# Patient Record
Sex: FEMALE | Employment: UNEMPLOYED | URBAN - METROPOLITAN AREA
[De-identification: names, ages, dates, MRNs, and addresses within clinical notes are randomized per-mention and may not be internally consistent; named-entity substitution may affect disease eponyms.]

---

## 2022-01-13 ENCOUNTER — OFFICE VISIT (OUTPATIENT)
Dept: FAMILY MEDICINE | Facility: CLINIC | Age: 32
End: 2022-01-13
Payer: COMMERCIAL

## 2022-01-13 VITALS
TEMPERATURE: 98.6 F | RESPIRATION RATE: 18 BRPM | SYSTOLIC BLOOD PRESSURE: 99 MMHG | WEIGHT: 135.7 LBS | HEART RATE: 78 BPM | BODY MASS INDEX: 22.61 KG/M2 | OXYGEN SATURATION: 98 % | DIASTOLIC BLOOD PRESSURE: 65 MMHG | HEIGHT: 65 IN

## 2022-01-13 DIAGNOSIS — Z23 NEED FOR TDAP VACCINATION: ICD-10-CM

## 2022-01-13 DIAGNOSIS — Z12.4 CERVICAL CANCER SCREENING: ICD-10-CM

## 2022-01-13 DIAGNOSIS — L30.9 DERMATITIS: ICD-10-CM

## 2022-01-13 DIAGNOSIS — H10.13 ALLERGIC CONJUNCTIVITIS, BILATERAL: ICD-10-CM

## 2022-01-13 DIAGNOSIS — Z02.89 ENCOUNTER FOR HEALTH EXAMINATION OF REFUGEE: Primary | ICD-10-CM

## 2022-01-13 LAB
ALBUMIN UR-MCNC: NEGATIVE MG/DL
APPEARANCE UR: CLEAR
BASOPHILS # BLD AUTO: 0 10E3/UL (ref 0–0.2)
BASOPHILS NFR BLD AUTO: 0 %
BILIRUB UR QL STRIP: NEGATIVE
COLOR UR AUTO: YELLOW
EOSINOPHIL # BLD AUTO: 0.2 10E3/UL (ref 0–0.7)
EOSINOPHIL NFR BLD AUTO: 2 %
ERYTHROCYTE [DISTWIDTH] IN BLOOD BY AUTOMATED COUNT: 12.4 % (ref 10–15)
GLUCOSE UR STRIP-MCNC: NEGATIVE MG/DL
HCT VFR BLD AUTO: 38.3 % (ref 35–47)
HGB BLD-MCNC: 12.7 G/DL (ref 11.7–15.7)
HGB UR QL STRIP: NEGATIVE
KETONES UR STRIP-MCNC: NEGATIVE MG/DL
LEUKOCYTE ESTERASE UR QL STRIP: NEGATIVE
LYMPHOCYTES # BLD AUTO: 2.3 10E3/UL (ref 0.8–5.3)
LYMPHOCYTES NFR BLD AUTO: 34 %
MCH RBC QN AUTO: 29.7 PG (ref 26.5–33)
MCHC RBC AUTO-ENTMCNC: 33.2 G/DL (ref 31.5–36.5)
MCV RBC AUTO: 90 FL (ref 78–100)
MONOCYTES # BLD AUTO: 0.5 10E3/UL (ref 0–1.3)
MONOCYTES NFR BLD AUTO: 7 %
NEUTROPHILS # BLD AUTO: 3.9 10E3/UL (ref 1.6–8.3)
NEUTROPHILS NFR BLD AUTO: 57 %
NITRATE UR QL: NEGATIVE
PH UR STRIP: 7 [PH] (ref 5–7)
PLATELET # BLD AUTO: 244 10E3/UL (ref 150–450)
RBC # BLD AUTO: 4.28 10E6/UL (ref 3.8–5.2)
SP GR UR STRIP: 1.02 (ref 1–1.03)
UROBILINOGEN UR STRIP-ACNC: 0.2 E.U./DL
WBC # BLD AUTO: 6.9 10E3/UL (ref 4–11)

## 2022-01-13 PROCEDURE — 36415 COLL VENOUS BLD VENIPUNCTURE: CPT | Performed by: FAMILY MEDICINE

## 2022-01-13 PROCEDURE — 90471 IMMUNIZATION ADMIN: CPT | Performed by: FAMILY MEDICINE

## 2022-01-13 PROCEDURE — 86787 VARICELLA-ZOSTER ANTIBODY: CPT | Performed by: FAMILY MEDICINE

## 2022-01-13 PROCEDURE — 81003 URINALYSIS AUTO W/O SCOPE: CPT | Performed by: FAMILY MEDICINE

## 2022-01-13 PROCEDURE — 86708 HEPATITIS A ANTIBODY: CPT | Performed by: FAMILY MEDICINE

## 2022-01-13 PROCEDURE — 86780 TREPONEMA PALLIDUM: CPT | Performed by: FAMILY MEDICINE

## 2022-01-13 PROCEDURE — G0145 SCR C/V CYTO,THINLAYER,RESCR: HCPCS | Performed by: FAMILY MEDICINE

## 2022-01-13 PROCEDURE — 86704 HEP B CORE ANTIBODY TOTAL: CPT | Performed by: FAMILY MEDICINE

## 2022-01-13 PROCEDURE — 99213 OFFICE O/P EST LOW 20 MIN: CPT | Mod: 25 | Performed by: FAMILY MEDICINE

## 2022-01-13 PROCEDURE — 87491 CHLMYD TRACH DNA AMP PROBE: CPT | Performed by: FAMILY MEDICINE

## 2022-01-13 PROCEDURE — 87389 HIV-1 AG W/HIV-1&-2 AB AG IA: CPT | Performed by: FAMILY MEDICINE

## 2022-01-13 PROCEDURE — 99000 SPECIMEN HANDLING OFFICE-LAB: CPT | Performed by: FAMILY MEDICINE

## 2022-01-13 PROCEDURE — 82607 VITAMIN B-12: CPT | Performed by: FAMILY MEDICINE

## 2022-01-13 PROCEDURE — 86706 HEP B SURFACE ANTIBODY: CPT | Performed by: FAMILY MEDICINE

## 2022-01-13 PROCEDURE — 87340 HEPATITIS B SURFACE AG IA: CPT | Performed by: FAMILY MEDICINE

## 2022-01-13 PROCEDURE — 87624 HPV HI-RISK TYP POOLED RSLT: CPT | Performed by: FAMILY MEDICINE

## 2022-01-13 PROCEDURE — 80053 COMPREHEN METABOLIC PANEL: CPT | Performed by: FAMILY MEDICINE

## 2022-01-13 PROCEDURE — 99385 PREV VISIT NEW AGE 18-39: CPT | Mod: 25 | Performed by: FAMILY MEDICINE

## 2022-01-13 PROCEDURE — 82306 VITAMIN D 25 HYDROXY: CPT | Performed by: FAMILY MEDICINE

## 2022-01-13 PROCEDURE — 85025 COMPLETE CBC W/AUTO DIFF WBC: CPT | Performed by: FAMILY MEDICINE

## 2022-01-13 PROCEDURE — 90715 TDAP VACCINE 7 YRS/> IM: CPT | Performed by: FAMILY MEDICINE

## 2022-01-13 PROCEDURE — 86803 HEPATITIS C AB TEST: CPT | Performed by: FAMILY MEDICINE

## 2022-01-13 PROCEDURE — 87591 N.GONORRHOEAE DNA AMP PROB: CPT | Performed by: FAMILY MEDICINE

## 2022-01-13 PROCEDURE — 86682 HELMINTH ANTIBODY: CPT | Mod: 90 | Performed by: FAMILY MEDICINE

## 2022-01-13 PROCEDURE — 86481 TB AG RESPONSE T-CELL SUSP: CPT | Performed by: FAMILY MEDICINE

## 2022-01-13 RX ORDER — OLOPATADINE HYDROCHLORIDE 2 MG/ML
1 SOLUTION/ DROPS OPHTHALMIC DAILY
Qty: 2.5 ML | Refills: 3 | Status: SHIPPED | OUTPATIENT
Start: 2022-01-13

## 2022-01-13 RX ORDER — TRIAMCINOLONE ACETONIDE 1 MG/G
OINTMENT TOPICAL 2 TIMES DAILY
Qty: 30 G | Refills: 0 | Status: SHIPPED | OUTPATIENT
Start: 2022-01-13 | End: 2022-01-27

## 2022-01-13 ASSESSMENT — ENCOUNTER SYMPTOMS
DIARRHEA: 0
EYE PAIN: 1
ABDOMINAL PAIN: 0
WEAKNESS: 0
DIZZINESS: 0
NERVOUS/ANXIOUS: 0
BREAST MASS: 0
DYSURIA: 0
NAUSEA: 0
CHILLS: 0
ARTHRALGIAS: 1
MYALGIAS: 1
PARESTHESIAS: 0
FREQUENCY: 0
SORE THROAT: 0
COUGH: 0
JOINT SWELLING: 0
SHORTNESS OF BREATH: 0
HEMATOCHEZIA: 0
HEADACHES: 1
FEVER: 0
HEARTBURN: 1
HEMATURIA: 0
CONSTIPATION: 0
PALPITATIONS: 0

## 2022-01-13 ASSESSMENT — ANXIETY QUESTIONNAIRES
7. FEELING AFRAID AS IF SOMETHING AWFUL MIGHT HAPPEN: NOT AT ALL
4. TROUBLE RELAXING: NOT AT ALL
7. FEELING AFRAID AS IF SOMETHING AWFUL MIGHT HAPPEN: NOT AT ALL
2. NOT BEING ABLE TO STOP OR CONTROL WORRYING: NOT AT ALL
GAD7 TOTAL SCORE: 1
1. FEELING NERVOUS, ANXIOUS, OR ON EDGE: NOT AT ALL
3. WORRYING TOO MUCH ABOUT DIFFERENT THINGS: SEVERAL DAYS
GAD7 TOTAL SCORE: 1
GAD7 TOTAL SCORE: 1
6. BECOMING EASILY ANNOYED OR IRRITABLE: NOT AT ALL
5. BEING SO RESTLESS THAT IT IS HARD TO SIT STILL: NOT AT ALL

## 2022-01-13 ASSESSMENT — MIFFLIN-ST. JEOR: SCORE: 1322.66

## 2022-01-13 ASSESSMENT — PATIENT HEALTH QUESTIONNAIRE - PHQ9
SUM OF ALL RESPONSES TO PHQ QUESTIONS 1-9: 6
SUM OF ALL RESPONSES TO PHQ QUESTIONS 1-9: 6
10. IF YOU CHECKED OFF ANY PROBLEMS, HOW DIFFICULT HAVE THESE PROBLEMS MADE IT FOR YOU TO DO YOUR WORK, TAKE CARE OF THINGS AT HOME, OR GET ALONG WITH OTHER PEOPLE: SOMEWHAT DIFFICULT

## 2022-01-13 NOTE — PROGRESS NOTES
Pt has concerns about Vitamin D.     SUBJECTIVE:   CC: Barbara Arguello is an 31 year old woman who presents for preventive health visit.       Patient has been advised of split billing requirements and indicates understanding: Yes  Healthy Habits:     Getting at least 3 servings of Calcium per day:  Yes    Bi-annual eye exam:  Yes    Dental care twice a year:  NO    Sleep apnea or symptoms of sleep apnea:  None    Diet:  Regular (no restrictions)    Frequency of exercise:  6-7 days/week    Duration of exercise:  30-45 minutes    Taking medications regularly:  Yes    Medication side effects:  None    PHQ-2 Total Score: 0    Additional concerns today:  Yes      [place] refugee. Former .       Hx of allergic conjunctivitis. Would like medication for this. Has also been having tooth pain for about two months and needs to see a dentist.     Today's PHQ-2 Score:   PHQ-2 ( 1999 Pfizer) 1/13/2022   Q1: Little interest or pleasure in doing things 0   Q2: Feeling down, depressed or hopeless 0   PHQ-2 Score 0   Q1: Little interest or pleasure in doing things Not at all   Q2: Feeling down, depressed or hopeless Not at all   PHQ-2 Score 0       Abuse: Current or Past (Physical, Sexual or Emotional) - No  Do you feel safe in your environment? Yes        Social History     Tobacco Use     Smoking status: Never Smoker     Smokeless tobacco: Never Used   Substance Use Topics     Alcohol use: Never     If you drink alcohol do you typically have >3 drinks per day or >7 drinks per week? No    Alcohol Use 1/13/2022   Prescreen: >3 drinks/day or >7 drinks/week? No   No flowsheet data found.    Reviewed orders with patient.  Reviewed health maintenance and updated orders accordingly - Yes  Labs reviewed in EPIC    Breast Cancer Screening:  Any new diagnosis of family breast, ovarian, or bowel cancer? No    FHS-7: No flowsheet data found.  click delete button to remove this line now  Patient under 40 years of age: Routine  "Mammogram Screening not recommended.   Pertinent mammograms are reviewed under the imaging tab.    History of abnormal Pap smear: NO - age 30- 65 PAP every 3 years recommended (has never had a pap)     Reviewed and updated as needed this visit by clinical staff  Tobacco  Allergies  Meds      Soc Hx       Reviewed and updated as needed this visit by Provider                   Review of Systems   Constitutional: Negative for chills and fever.   HENT: Negative for congestion, ear pain, hearing loss and sore throat.    Eyes: Positive for pain. Negative for visual disturbance.   Respiratory: Negative for cough and shortness of breath.    Cardiovascular: Negative for chest pain, palpitations and peripheral edema.   Gastrointestinal: Positive for heartburn. Negative for abdominal pain, constipation, diarrhea, hematochezia and nausea.   Breasts:  Negative for tenderness, breast mass and discharge.   Genitourinary: Positive for pelvic pain. Negative for dysuria, frequency, genital sores, hematuria, urgency, vaginal bleeding and vaginal discharge.   Musculoskeletal: Positive for arthralgias and myalgias. Negative for joint swelling.   Skin: Negative for rash.   Neurological: Positive for headaches. Negative for dizziness, weakness and paresthesias.   Psychiatric/Behavioral: Positive for mood changes. The patient is not nervous/anxious.           OBJECTIVE:   BP 99/65 (BP Location: Right arm, Patient Position: Chair, Cuff Size: Adult Regular)   Pulse 78   Temp 98.6  F (37  C) (Oral)   Resp 18   Ht 1.645 m (5' 4.76\")   Wt 61.6 kg (135 lb 11.2 oz)   LMP 01/06/2022 (Exact Date)   SpO2 98%   BMI 22.75 kg/m    Physical Exam  GENERAL: healthy, alert and no distress  EYES: Eyes grossly normal to inspection, PERRL and conjunctivae and sclerae normal  HENT: ear canals and TM's normal, nose and mouth without ulcers or lesions  NECK: no adenopathy, no asymmetry, masses, or scars and thyroid normal to palpation  RESP: lungs " clear to auscultation - no rales, rhonchi or wheezes  BREAST: normal without masses, tenderness or nipple discharge and no palpable axillary masses or adenopathy  CV: regular rate and rhythm, normal S1 S2, no S3 or S4, no murmur, click or rub, no peripheral edema and peripheral pulses strong  ABDOMEN: soft, nontender, no hepatosplenomegaly, no masses and bowel sounds normal   (female): normal female external genitalia, normal urethral meatus, vaginal mucosa pink, moist, well rugated, and normal cervix/adnexa/uterus without masses or discharge  MS: no gross musculoskeletal defects noted, no edema  SKIN: erythematous papules with some excoriations on anterior base of neck   NEURO: Normal strength and tone, mentation intact and speech normal  PSYCH: mentation appears normal, affect normal/bright    Diagnostic Test Results:  Labs reviewed in Epic  none     ASSESSMENT/PLAN:   (Z02.89) Encounter for health examination of refugee  (primary encounter diagnosis)  Plan: CBC with Platelets & Differential, Chlamydia         trachomatis PCR, Comprehensive metabolic panel,        Hepatitis A Antibody IgG, Hepatitis B core         antibody, Hepatitis B Surface Antibody,         Hepatitis B surface antigen, Hepatitis C Screen        Reflex to HCV RNA Quant and Genotype, HIV         Antigen Antibody Combo, Neisseria gonorrhoeae         PCR, Routine parasitology exam, Blood         parasitology exam, Quantiferon-TB Gold Plus,         Schistosoma Antibody IgG, Strongyloides         antibody IgG, Treponema Abs w Reflex to RPR and        Titer, UA reflex to Microscopic and Culture,         Varicella Zoster Virus Antibody IgG, Vitamin D         Deficiency, Vitamin B12      (Z12.4) Cervical cancer screening  Comment: pap done today. No prior history. Will repeat in 3 years in [place].   Plan: Pap screen with HPV - recommended age 30 - 65         years      (L30.9) Dermatitis  Comment: supportive care discussed   Plan: triamcinolone  "(KENALOG) 0.1 % external ointment      (H10.13) Allergic conjunctivitis, bilateral  Comment: by history   Plan: olopatadine (PATADAY) 0.2 % ophthalmic solution      (Z23) Need for Tdap vaccination  Plan: TDAP VACCINE (Adacel, Boostrix)  [[id number]]        Patient has been advised of split billing requirements and indicates understanding: Yes  COUNSELING:  Reviewed preventive health counseling, as reflected in patient instructions       Regular exercise       Healthy diet/nutrition    Estimated body mass index is 22.75 kg/m  as calculated from the following:    Height as of this encounter: 1.645 m (5' 4.76\").    Weight as of this encounter: 61.6 kg (135 lb 11.2 oz).        She reports that she has never smoked. She has never used smokeless tobacco.      Counseling Resources:  ATP IV Guidelines  Pooled Cohorts Equation Calculator  Breast Cancer Risk Calculator  BRCA-Related Cancer Risk Assessment: FHS-7 Tool  FRAX Risk Assessment  ICSI Preventive Guidelines  Dietary Guidelines for Americans, 2010  USDA's MyPlate  ASA Prophylaxis  Lung CA Screening    María Martinez MD  [facility]"

## 2022-01-14 PROBLEM — H10.13 ALLERGIC CONJUNCTIVITIS, BILATERAL: Status: ACTIVE | Noted: 2022-01-14

## 2022-01-14 LAB
DEPRECATED CALCIDIOL+CALCIFEROL SERPL-MC: 25 UG/L (ref 20–75)
HAV IGG SER QL IA: REACTIVE
HBV CORE AB SERPL QL IA: NONREACTIVE
HBV SURFACE AB SERPL IA-ACNC: 1.24 M[IU]/ML
HBV SURFACE AG SERPL QL IA: NONREACTIVE
HCV AB SERPL QL IA: NONREACTIVE
HIV 1+2 AB+HIV1 P24 AG SERPL QL IA: NONREACTIVE
T PALLIDUM AB SER QL: NONREACTIVE
VIT B12 SERPL-MCNC: 319 PG/ML (ref 193–986)

## 2022-01-14 ASSESSMENT — ANXIETY QUESTIONNAIRES: GAD7 TOTAL SCORE: 1

## 2022-01-14 ASSESSMENT — PATIENT HEALTH QUESTIONNAIRE - PHQ9: SUM OF ALL RESPONSES TO PHQ QUESTIONS 1-9: 6

## 2022-01-15 LAB
ALBUMIN SERPL-MCNC: 4 G/DL (ref 3.4–5)
ALP SERPL-CCNC: 41 U/L (ref 40–150)
ALT SERPL W P-5'-P-CCNC: 24 U/L (ref 0–50)
ANAPLASMA BLD MOD GIEMSA: NEGATIVE
ANION GAP SERPL CALCULATED.3IONS-SCNC: 8 MMOL/L (ref 3–14)
AST SERPL W P-5'-P-CCNC: 18 U/L (ref 0–45)
BABESIA SPEC: NEGATIVE
BILIRUB SERPL-MCNC: 0.3 MG/DL (ref 0.2–1.3)
BUN SERPL-MCNC: 8 MG/DL (ref 7–30)
CALCIUM SERPL-MCNC: 9.1 MG/DL (ref 8.5–10.1)
CHLORIDE BLD-SCNC: 107 MMOL/L (ref 94–109)
CO2 SERPL-SCNC: 24 MMOL/L (ref 20–32)
CREAT SERPL-MCNC: 0.56 MG/DL (ref 0.52–1.04)
EHRLICHIA SPEC QL MICRO: NEGATIVE
GAMMA INTERFERON BACKGROUND BLD IA-ACNC: 0.2 IU/ML
GFR SERPL CREATININE-BSD FRML MDRD: >90 ML/MIN/1.73M2
GLUCOSE BLD-MCNC: 99 MG/DL (ref 70–99)
M TB IFN-G BLD-IMP: NEGATIVE
M TB IFN-G CD4+ BCKGRND COR BLD-ACNC: 9.8 IU/ML
MALARIA SMEAR BLD: NEGATIVE
MICROFILARIAE: NEGATIVE
MITOGEN IGNF BCKGRD COR BLD-ACNC: -0.11 IU/ML
MITOGEN IGNF BCKGRD COR BLD-ACNC: 0.09 IU/ML
POTASSIUM BLD-SCNC: 3.9 MMOL/L (ref 3.4–5.3)
PROT SERPL-MCNC: 7.3 G/DL (ref 6.8–8.8)
QUANTIFERON MITOGEN: 10 IU/ML
QUANTIFERON NIL TUBE: 0.2 IU/ML
QUANTIFERON TB1 TUBE: 0.29 IU/ML
QUANTIFERON TB2 TUBE: 0.09
SODIUM SERPL-SCNC: 139 MMOL/L (ref 133–144)
TRYPANOSOMA: NEGATIVE

## 2022-01-16 LAB
C TRACH DNA SPEC QL NAA+PROBE: NEGATIVE
N GONORRHOEA DNA SPEC QL NAA+PROBE: NEGATIVE

## 2022-01-17 LAB
BKR LAB AP GYN ADEQUACY: NORMAL
BKR LAB AP GYN INTERPRETATION: NORMAL
BKR LAB AP HPV REFLEX: NORMAL
BKR LAB AP LMP: NORMAL
BKR LAB AP PREVIOUS ABNORMAL: NORMAL
PATH REPORT.COMMENTS IMP SPEC: NORMAL
PATH REPORT.COMMENTS IMP SPEC: NORMAL
PATH REPORT.RELEVANT HX SPEC: NORMAL
STRONGYLOIDES IGG SER IA-ACNC: 1.4 IV
VZV IGG SER QL IA: 1024 INDEX
VZV IGG SER QL IA: POSITIVE

## 2022-01-18 ENCOUNTER — TELEPHONE (OUTPATIENT)
Dept: FAMILY MEDICINE | Facility: CLINIC | Age: 32
End: 2022-01-18
Payer: COMMERCIAL

## 2022-01-18 DIAGNOSIS — B78.9 INFECTION DUE TO STRONGYLOIDES: ICD-10-CM

## 2022-01-18 DIAGNOSIS — B78.9 INFECTION DUE TO STRONGYLOIDES: Primary | ICD-10-CM

## 2022-01-18 RX ORDER — IVERMECTIN 3 MG/1
12 TABLET ORAL ONCE
Qty: 4 TABLET | Refills: 0 | Status: CANCELLED | OUTPATIENT
Start: 2022-01-18 | End: 2022-01-18

## 2022-01-18 RX ORDER — IVERMECTIN 3 MG/1
12 TABLET ORAL ONCE
Qty: 4 TABLET | Refills: 0 | Status: SHIPPED | OUTPATIENT
Start: 2022-01-18 | End: 2022-01-18

## 2022-01-18 NOTE — TELEPHONE ENCOUNTER
Please do not close this encounter until this has been addressed.  (prior auth approved/denied, prescriber refusal to complete prior auth or medication changed/discontinued)    Prior Authorization needed on: Ivermectin 3mg  Drug NDC: [address]-30    Insurance: [employer] [facility]  Member ID: [id number]  Insurance phone #: [phone]    Pharmacy NPI: [id number]  Pharmacy Phone #: [phone]  Pharmacy Fax #: [phone]    Please let us know if the PA gets approved or denied or if medication is changed    Thank you,  Brittnee Beltran & [facility] Staff Technician   [facility]  [email or web address]   Ph#: [phone]  Fax#: [phone]

## 2022-01-18 NOTE — TELEPHONE ENCOUNTER
Central Prior Authorization Team  Phone: [phone]    PA Initiation    Medication: Ivermectin 3mg  Insurance Company: [employer] - Phone [phone] Fax [phone]  Pharmacy Filling the Rx: [place], [place] - [address]  Filling Pharmacy Phone: [phone]  Filling Pharmacy Fax:    Start Date: 1/18/2022

## 2022-01-18 NOTE — TELEPHONE ENCOUNTER
DALTON to CAROLYN cadena, inform Gladis at [facility] when PA final at [phone], she will inform pt  Janice Bhardwaj, RN, BSN  [facility]

## 2022-01-18 NOTE — TELEPHONE ENCOUNTER
----- Message from María Martinez MD sent at 1/18/2022  9:06 AM CST -----  Please notify patient she has a parasitic infection. Ivermectin sent to pharmacy to take as 1 time dose. Vitamin D on lower end of normal- advise taking vimtain D 1,000 international unit(s) over the counter daily especially for the rest of winter. All other labs are stable.       NWD

## 2022-01-18 NOTE — TELEPHONE ENCOUNTER
Prior Authorization Approval    Authorization Effective Date: 1/18/2022  Authorization Expiration Date: 1/18/2022  Medication: Ivermectin 3mg- APPROVED   Approved Dose/Quantity:  Reference #:     Insurance Company: [employer] - Phone [phone] Fax [phone]  Expected CoPay:       CoPay Card Available:      Foundation Assistance Needed:    Which Pharmacy is filling the prescription (Not needed for infusion/clinic administered): [facility] [place], [place] - [address] [address]  Pharmacy Notified: Yes  Patient Notified:  **Instructed pharmacy to notify patient when script is ready to /ship.**  ONE TIME APPROVAL ON 1/18/2022.

## 2022-01-18 NOTE — TELEPHONE ENCOUNTER
Attempt #1 to reach patient regarding message below with assistance of Julia . Left voicemail to return phone call to clinic.       JETT WhiteN, RN  [facility]

## 2022-01-18 NOTE — TELEPHONE ENCOUNTER
See note below, called Gladis, informed, she will also f/u with pt  Janice Bhardwaj RN, BSN  [facility]

## 2022-01-19 LAB
HUMAN PAPILLOMA VIRUS 16 DNA: NEGATIVE
HUMAN PAPILLOMA VIRUS 18 DNA: NEGATIVE
HUMAN PAPILLOMA VIRUS FINAL DIAGNOSIS: NORMAL
HUMAN PAPILLOMA VIRUS OTHER HR: NEGATIVE

## 2022-01-19 NOTE — ADDENDUM NOTE
Addended by: DEVON FERRARI on: 1/19/2022 12:41 PM     Modules accepted: Orders     Department of Anesthesiology  Preprocedure Note       Name:  Gaston Lopez   Age:  63 y.o.  :  1966                                          MRN:  [id number]         Date:  6/3/2021      Surgeon: Jp Baires):  Sara Pretty DO    Procedure: Procedure(s):  VIDEO HYSTEROSCOPY DILATATION AND CURETTAGE, POSSIBLE MYOSURE, POSSIBLE POLYPECTOMY    Medications prior to admission:   Prior to Admission medications    Medication Sig Start Date End Date Taking? Authorizing Provider   traMADol (ULTRAM) 50 MG tablet Take 50 mg by mouth every 6 hours as needed for Pain. Yes Historical Provider, MD   ibuprofen (ADVIL;MOTRIN) 600 MG tablet TAKE 1 TABLET BY MOUTH EVERY 8 HOURS AS NEEDED FOR PAIN 21  Yes Emilio Armando DO   Acetaminophen (TYLENOL PO) Take by mouth   Yes Historical Provider, MD   DULoxetine (CYMBALTA) 60 MG extended release capsule TAKE 1 CAPSULE BY MOUTH EVERY DAY 21  Yes Emilio Armando DO   methocarbamol (ROBAXIN) 500 MG tablet TAKE 1 TABLET BY MOUTH 3 TIMES A DAY AS NEEDED FOR NECK PAIN 21  Yes Emilio Armando DO   atenolol (TENORMIN) 25 MG tablet TAKE 1 TABLET BY MOUTH EVERY DAY 3/10/21  Yes Emilio Armando, DO   rOPINIRole (REQUIP) 2 MG tablet TAKE 1 TABLET BY MOUTH TWICE A DAY 3/9/21  Yes Emilio Armando DO   omeprazole (PRILOSEC) 40 MG delayed release capsule TAKE 1 CAPSULE BY MOUTH EVERY DAY 21  Yes Emilio Armando DO   MELATONIN PO Take 20 mg by mouth nightly as needed    Yes Historical Provider, MD   ALPRAZolam Yvonne Alcaraz) 1 MG tablet Take 1 tablet by mouth 3 times daily as needed for Anxiety for up to 60 days. 21  Emilio Armando DO   lidocaine (XYLOCAINE) 5 % ointment APPLY TOPICALLY AS NEEDED TO AREA OF TENDERNESS UNDER ARM.  3/15/21   Emilio Armando DO       Current medications:    Current Facility-Administered Medications   Medication Dose Route Frequency Provider Last Rate Last Admin    lactated ringers infusion   Intravenous Continuous Kaitlynn George MD       [facility] sodium chloride flush 0.9 % injection 5-40 mL  5-40 mL Intravenous 2 times per day Nighat Hogue MD        sodium chloride flush 0.9 % injection 5-40 mL  5-40 mL Intravenous PRN Nighat Hogue MD        0.9 % sodium chloride infusion  25 mL Intravenous PRN Nighat Hogue MD        lidocaine PF 1 % injection 0.3 mL  0.3 mL Intradermal Once PRN Nighat Hogue MD           Allergies:     Allergies   Allergen Reactions    Toradol [Ketorolac Tromethamine] Other (See Comments)     \"out of it\"  \"felt like sleep paralysis\"    Haldol [Haloperidol Lactate] Other (See Comments)     \"felt out of it, similar to the toradol\"       Problem List:    Patient Active Problem List   Diagnosis Code    Depression F32.9    Knee pain M25.569    Chondromalacia of left knee M94.262    Synovitis of knee M65.9    Bilateral carpal tunnel syndrome G56.03    Lumbar strain S39.012A    Anxiety F41.9    Restless leg syndrome G25.81    De Quervain's disease (radial styloid tenosynovitis) M65.4    Osteoarthritis of carpometacarpal joint of thumb M18.9    Hemorrhoid prolapse K64.8    Dyspnea on exertion R06.00    Sciatica M54.30    Intractable vomiting with nausea R11.2    Hiatal hernia K44.9    Elevated blood pressure reading R03.0    Major depressive disorder, recurrent, moderate (HCC) F33.1    Generalized anxiety disorder F41.1    Essential hypertension I10    Closed displaced fracture of middle phalanx of right ring finger S62.624A    Anal lesion K62.9    Adjustment disorder with depressed mood F43.21    Neck pain M54.2       Past Medical History:        Diagnosis Date    Anxiety     Depression     Endometriosis     GERD (gastroesophageal reflux disease)     Hypertension     Osteoarthritis     Restless leg syndrome        Past Surgical History:        Procedure Laterality Date    ANUS SURGERY  2018    fissure     SECTION      x3    COLONOSCOPY      FRACTURE SURGERY      right wrist    HERNIA REPAIR 03/06/2018    LAPAROSCOPIC PARAESOPHAGEAL HERNIA REPAIR WITH MESH    KNEE ARTHROSCOPY Left 11/15/12    ARTHROSCOPY LEFT KNEE WITH SYOVECTOMY AND CHONDROPLASTY    OVARY REMOVAL Right 2010    ROTATOR CUFF REPAIR Right 6/23/2020    EXAM UNDER ANESTHESIA VIDEO ARTHROSCOPY RIGHT SHOULDER, MINI- OPEN ROTATOR CUFF REPAIR, NEER ACROMIOPLASTY, LENO PROCEDURE WITH EXPAREL performed by Jesus Uriostegui MD at [place] [address] ARTHROSCOPY Right 11/25/2020    VIDEO ARTHROSCOPY RIGHT SHOULDER, OPEN ROTATOR CUFF REPAIR, BICEPS TENOTOMY -BLOCK- performed by Cely Mota MD at [place] [address] ARTHROSCOPY Right 2/24/2021    RIGHT SHOULDER ARTHROSCOPIC INCISION AND DRAINAGE performed by Cely Mota MD at [place] [address] ARTHROSCOPY Right 4/28/2021    VIDEO ARTHROSCOPY RIGHT SHOULDER, ROTATOR CUFF REPAIR, DEBRIDEMENT performed by Cely Moat MD at [address] [place] [address] ENDOSCOPY  2011    UPPER GASTROINTESTINAL ENDOSCOPY  2015    esophageasl stretch       Social History:    Social History     Tobacco Use    Smoking status: Never Smoker    Smokeless tobacco: Never Used   Substance Use Topics    Alcohol use: Yes     Comment: 1 -2 drinks per month                                Counseling given: Not Answered      Vital Signs (Current):   Vitals:    06/02/21 0845 06/03/21 0730   BP:  (!) 149/67   Pulse:  64   Resp:  16   Temp:  97.2 °F (36.2 °C)   TempSrc:  Temporal   SpO2:  94%   Weight: 293 lb (132.9 kg) 293 lb (132.9 kg)   Height: 5' 6\" (1.676 m) 5' 6\" (1.676 m)                                              BP Readings from Last 3 Encounters:   06/03/21 (!) 149/67   05/18/21 118/76   05/17/21 132/80       NPO Status: Time of last liquid consumption: 2230                        Time of last solid consumption: 1900                        Date of last liquid consumption: 06/02/21                        Date of last solid food consumption: 06/02/21    BMI:   Wt Readings from Last 3 Encounters:   06/03/21 293 lb (132.9 kg)   05/18/21 293 lb 3.2 oz (133 kg)   05/17/21 290 lb (131.5 kg)     Body mass index is 47.29 kg/m². CBC:   Lab Results   Component Value Date    WBC 7.5 04/26/2021    RBC 4.61 04/26/2021    HGB 14.2 04/26/2021    HCT 43.4 04/26/2021    MCV 94.1 04/26/2021    RDW 15.2 04/26/2021     04/26/2021       CMP:   Lab Results   Component Value Date     04/26/2021    K 4.2 04/26/2021    K 4.3 04/21/2018     04/26/2021    CO2 25 04/26/2021    BUN 15 04/26/2021    CREATININE 1.0 04/26/2021    GFRAA >60 04/26/2021    GFRAA >60 02/01/2012    AGRATIO 1.7 11/23/2020    LABGLOM 57 04/26/2021    GLUCOSE 125 04/26/2021    PROT 6.5 11/23/2020    PROT 7.9 02/02/2012    CALCIUM 8.9 04/26/2021    BILITOT 0.3 11/23/2020    ALKPHOS 82 11/23/2020    AST 21 11/23/2020    ALT 32 11/23/2020       POC Tests: No results for input(s): POCGLU, POCNA, POCK, POCCL, POCBUN, POCHEMO, POCHCT in the last 72 hours.     Coags:   Lab Results   Component Value Date    PROTIME 12.0 10/16/2013    INR 1.07 10/16/2013       HCG (If Applicable):   Lab Results   Component Value Date    PREGTESTUR Neg 11/15/2012        ABGs: No results found for: PHART, PO2ART, EBS5KCD, MJD1OSA, BEART, G3NVEDYR     Type & Screen (If Applicable):  No results found for: LABABO, LABRH    Drug/Infectious Status (If Applicable):  No results found for: HIV, HEPCAB    COVID-19 Screening (If Applicable):   Lab Results   Component Value Date    COVID19 Not Detected 04/22/2021    COVID19 NOT DETECTED 01/06/2021           Anesthesia Evaluation    Airway: Mallampati: II  TM distance: <3 FB   Neck ROM: limited  Mouth opening: > = 3 FB Dental:    (+) upper dentures and lower dentures      Pulmonary:   (+) shortness of breath:                             Cardiovascular:    (+) hypertension:, FAULKNER:,                   Neuro/Psych:   (+) neuromuscular disease:, psychiatric history:depression/anxiety GI/Hepatic/Renal:   (+) hiatal hernia, GERD: well controlled, morbid obesity          Endo/Other: Negative Endo/Other ROS                    Abdominal:           Vascular: negative vascular ROS. Anesthesia Plan      general     ASA 3     (Risks, benefits and alternatives of GA discussed with pt. Questions answered. Willing to proceed.)  Induction: intravenous. Anesthetic plan and risks discussed with patient.                       Mandeep Ferreira MD   6/3/2021

## 2022-01-20 LAB — SCHISTOSOMA IGG SER QL: NEGATIVE

## 2022-01-24 PROCEDURE — 87177 OVA AND PARASITES SMEARS: CPT | Performed by: FAMILY MEDICINE

## 2022-01-24 PROCEDURE — 87209 SMEAR COMPLEX STAIN: CPT | Performed by: FAMILY MEDICINE

## 2022-01-26 LAB — O+P STL MICRO: NEGATIVE

## 2022-01-28 ENCOUNTER — TELEPHONE (OUTPATIENT)
Dept: FAMILY MEDICINE | Facility: CLINIC | Age: 32
End: 2022-01-28
Payer: COMMERCIAL

## 2022-01-28 NOTE — TELEPHONE ENCOUNTER
Was on phone with Gladis for roommate and Gladis asked about this result as well.  Advised Arefa's stool sample is negative.  Helga Torres, RN    RN pls also notify [facility] ADRIANE Johnson [phone]        SHAINA

## 2022-06-17 ENCOUNTER — OFFICE VISIT (OUTPATIENT)
Dept: URGENT CARE | Facility: URGENT CARE | Age: 32
End: 2022-06-17
Payer: MEDICAID

## 2022-06-17 VITALS
HEART RATE: 71 BPM | SYSTOLIC BLOOD PRESSURE: 110 MMHG | BODY MASS INDEX: 22.63 KG/M2 | RESPIRATION RATE: 16 BRPM | TEMPERATURE: 97.7 F | DIASTOLIC BLOOD PRESSURE: 65 MMHG | OXYGEN SATURATION: 99 % | WEIGHT: 135 LBS

## 2022-06-17 DIAGNOSIS — J20.8 ACUTE BACTERIAL BRONCHITIS: Primary | ICD-10-CM

## 2022-06-17 DIAGNOSIS — B96.89 ACUTE BACTERIAL BRONCHITIS: Primary | ICD-10-CM

## 2022-06-17 DIAGNOSIS — J98.01 ACUTE BRONCHOSPASM: ICD-10-CM

## 2022-06-17 PROCEDURE — 99213 OFFICE O/P EST LOW 20 MIN: CPT | Performed by: PHYSICIAN ASSISTANT

## 2022-06-17 RX ORDER — AZITHROMYCIN 250 MG/1
TABLET, FILM COATED ORAL
Qty: 6 TABLET | Refills: 0 | Status: SHIPPED | OUTPATIENT
Start: 2022-06-17 | End: 2022-06-22

## 2022-06-17 RX ORDER — BENZONATATE 200 MG/1
200 CAPSULE ORAL 3 TIMES DAILY PRN
Qty: 21 CAPSULE | Refills: 0 | Status: SHIPPED | OUTPATIENT
Start: 2022-06-17 | End: 2022-06-24

## 2022-06-17 RX ORDER — ALBUTEROL SULFATE 90 UG/1
2 AEROSOL, METERED RESPIRATORY (INHALATION) EVERY 6 HOURS
Qty: 8.5 G | Refills: 0 | Status: SHIPPED | OUTPATIENT
Start: 2022-06-17

## 2022-06-17 RX ORDER — CODEINE PHOSPHATE AND GUAIFENESIN 10; 100 MG/5ML; MG/5ML
1-2 SOLUTION ORAL
Qty: 118 ML | Refills: 0 | Status: SHIPPED | OUTPATIENT
Start: 2022-06-17

## 2022-06-17 NOTE — PROGRESS NOTES
Assessment & Plan     Acute bacterial bronchitis    Bronchitis is an infection of the air passages (bronchial tubes) in your lungs. It often occurs when you have a cold. This illness is contagious during the first few days and is spread through the air by coughing and sneezing, or by direct contact (touching the sick person and then touching your own eyes, nose, or mouth).  Symptoms of bronchitis include cough with mucus (phlegm) and low-grade fever. Bronchitis usually lasts 7 to 14 days. Mild cases can be treated with simple home remedies. More severe infection is treated with an antibiotic.    - azithromycin (ZITHROMAX) 250 MG tablet; Take 2 tablets (500 mg) by mouth daily for 1 day, THEN 1 tablet (250 mg) daily for 4 days.  - albuterol (PROVENTIL HFA) 108 (90 Base) MCG/ACT inhaler; Inhale 2 puffs into the lungs every 6 hours  - benzonatate (TESSALON) 200 MG capsule; Take 1 capsule (200 mg) by mouth 3 times daily as needed  - guaiFENesin-codeine (ROBITUSSIN AC) 100-10 MG/5ML solution; Take 5-10 mLs by mouth nightly as needed for cough    Acute bronchospasm    proventil for bronchospasms  Robitussin ac for coughing at night         At today's visit with Barbara Arguello , we discussed results, diagnosis, medications and formulated a plan.  We also discussed red flags for immediate return to clinic/ER, as well as indications for follow up if no improvement. Patient understood and agreed to plan. Barbara Arguello was discharged with stable vitals and has no further questions.       No follow-ups on file.    Cristino Foley, [facility], PA-C  [facility]    Subjective   Barbara is a 31 year old, presenting for the following health issues:  Cough (Cough and runny nose X12 days that is getting worse. Neg covid test )      HPI     Barbara Arguello, 31 year old, female presents to the urgent care today with:   Cough (Cough and runny nose X12 days that is getting worse. Neg covid test )      Review of Systems    Constitutional, HEENT, cardiovascular, pulmonary, gi and gu systems are negative, except as otherwise noted.      Objective    /65   Pulse 71   Temp 97.7  F (36.5  C) (Tympanic)   Resp 16   Wt 61.2 kg (135 lb)   SpO2 99%   BMI 22.63 kg/m    Body mass index is 22.63 kg/m .  Physical Exam   GENERAL: healthy, alert and no distress  EYES: Eyes grossly normal to inspection, PERRL and conjunctivae and sclerae normal  HENT: ear canals and TM's normal, nose and mouth without ulcers or lesions  NECK: no adenopathy, no asymmetry, masses, or scars and thyroid normal to palpation  RESP: Positive for wheezing and bronchospasms  CV: regular rate and rhythm, normal S1 S2, no S3 or S4, no murmur, click or rub, no peripheral edema and peripheral pulses strong  ABDOMEN: soft, nontender, no hepatosplenomegaly, no masses and bowel sounds normal  MS: no gross musculoskeletal defects noted, no edema  SKIN: no suspicious lesions or rashes  NEURO: Normal strength and tone, mentation intact and speech normal  PSYCH: mentation appears normal, affect normal/bright                  .  ..

## 2024-03-19 PROCEDURE — 80053 COMPREHEN METABOLIC PANEL: CPT | Performed by: EMERGENCY MEDICINE

## 2024-03-19 PROCEDURE — 83690 ASSAY OF LIPASE: CPT | Performed by: EMERGENCY MEDICINE

## 2024-03-19 PROCEDURE — 84702 CHORIONIC GONADOTROPIN TEST: CPT | Performed by: EMERGENCY MEDICINE

## 2024-03-19 PROCEDURE — 85025 COMPLETE CBC W/AUTO DIFF WBC: CPT | Performed by: EMERGENCY MEDICINE

## 2024-03-19 PROCEDURE — 99284 EMERGENCY DEPT VISIT MOD MDM: CPT

## 2024-03-19 PROCEDURE — 96374 THER/PROPH/DIAG INJ IV PUSH: CPT

## 2024-03-19 ASSESSMENT — PAIN SCALES - GENERAL: PAINLEVEL_OUTOF10: 8

## 2024-03-20 ENCOUNTER — HOSPITAL ENCOUNTER (EMERGENCY)
Age: 34
Discharge: HOME | End: 2024-03-20
Attending: EMERGENCY MEDICINE

## 2024-03-20 ENCOUNTER — APPOINTMENT (OUTPATIENT)
Dept: ULTRASOUND IMAGING | Age: 34
End: 2024-03-20
Attending: STUDENT IN AN ORGANIZED HEALTH CARE EDUCATION/TRAINING PROGRAM

## 2024-03-20 VITALS
HEIGHT: 65 IN | WEIGHT: 120.81 LBS | OXYGEN SATURATION: 98 % | BODY MASS INDEX: 20.13 KG/M2 | SYSTOLIC BLOOD PRESSURE: 117 MMHG | HEART RATE: 89 BPM | DIASTOLIC BLOOD PRESSURE: 72 MMHG | RESPIRATION RATE: 18 BRPM | TEMPERATURE: 98.4 F

## 2024-03-20 DIAGNOSIS — R10.2 PELVIC PAIN IN FEMALE: Primary | ICD-10-CM

## 2024-03-20 LAB
ALBUMIN SERPL-MCNC: 3.2 G/DL (ref 3.6–5.1)
ALBUMIN/GLOB SERPL: 0.9 {RATIO} (ref 1–2.4)
ALP SERPL-CCNC: 43 UNITS/L (ref 45–117)
ALT SERPL-CCNC: 29 UNITS/L
ANION GAP SERPL CALC-SCNC: 7 MMOL/L (ref 7–19)
APPEARANCE UR: CLEAR
AST SERPL-CCNC: 21 UNITS/L
BACTERIA #/AREA URNS HPF: ABNORMAL /HPF
BASOPHILS # BLD: 0 K/MCL (ref 0–0.3)
BASOPHILS NFR BLD: 1 %
BILIRUB SERPL-MCNC: 0.2 MG/DL (ref 0.2–1)
BILIRUB UR QL STRIP: NEGATIVE
BUN SERPL-MCNC: 14 MG/DL (ref 6–20)
BUN/CREAT SERPL: 23 (ref 7–25)
C TRACH RRNA UR QL NAA+PROBE: NEGATIVE
CALCIUM SERPL-MCNC: 8.7 MG/DL (ref 8.4–10.2)
CHLORIDE SERPL-SCNC: 114 MMOL/L (ref 97–110)
CO2 SERPL-SCNC: 24 MMOL/L (ref 21–32)
COLOR UR: ABNORMAL
CREAT SERPL-MCNC: 0.61 MG/DL (ref 0.51–0.95)
DEPRECATED RDW RBC: 39.8 FL (ref 39–50)
EGFRCR SERPLBLD CKD-EPI 2021: >90 ML/MIN/{1.73_M2}
EOSINOPHIL # BLD: 0.1 K/MCL (ref 0–0.5)
EOSINOPHIL NFR BLD: 1 %
ERYTHROCYTE [DISTWIDTH] IN BLOOD: 12.2 % (ref 11–15)
FASTING DURATION TIME PATIENT: ABNORMAL H
GLOBULIN SER-MCNC: 3.7 G/DL (ref 2–4)
GLUCOSE SERPL-MCNC: 103 MG/DL (ref 70–99)
GLUCOSE UR STRIP-MCNC: NEGATIVE MG/DL
HCG SERPL-ACNC: <2 MUNITS/ML
HCT VFR BLD CALC: 37.2 % (ref 36–46.5)
HGB BLD-MCNC: 12.1 G/DL (ref 12–15.5)
HGB UR QL STRIP: NEGATIVE
HYALINE CASTS #/AREA URNS LPF: ABNORMAL /LPF
IMM GRANULOCYTES # BLD AUTO: 0 K/MCL (ref 0–0.2)
IMM GRANULOCYTES # BLD: 0 %
KETONES UR STRIP-MCNC: NEGATIVE MG/DL
LEUKOCYTE ESTERASE UR QL STRIP: ABNORMAL
LIPASE SERPL-CCNC: 36 UNITS/L (ref 15–77)
LYMPHOCYTES # BLD: 2.2 K/MCL (ref 1–4.8)
LYMPHOCYTES NFR BLD: 34 %
Lab: NORMAL
MCH RBC QN AUTO: 29 PG (ref 26–34)
MCHC RBC AUTO-ENTMCNC: 32.5 G/DL (ref 32–36.5)
MCV RBC AUTO: 89.2 FL (ref 78–100)
MONOCYTES # BLD: 0.5 K/MCL (ref 0.3–0.9)
MONOCYTES NFR BLD: 7 %
MUCOUS THREADS URNS QL MICRO: PRESENT
N GONORRHOEA RRNA UR QL NAA+PROBE: NEGATIVE
NEUTROPHILS # BLD: 3.7 K/MCL (ref 1.8–7.7)
NEUTROPHILS NFR BLD: 57 %
NITRITE UR QL STRIP: NEGATIVE
NRBC BLD MANUAL-RTO: 0 /100 WBC
PH UR STRIP: 5.5 [PH] (ref 5–7)
PLATELET # BLD AUTO: 190 K/MCL (ref 140–450)
POTASSIUM SERPL-SCNC: 3.7 MMOL/L (ref 3.4–5.1)
PROT SERPL-MCNC: 6.9 G/DL (ref 6.4–8.2)
PROT UR STRIP-MCNC: ABNORMAL MG/DL
RBC # BLD: 4.17 MIL/MCL (ref 4–5.2)
RBC #/AREA URNS HPF: ABNORMAL /HPF
SODIUM SERPL-SCNC: 141 MMOL/L (ref 135–145)
SP GR UR STRIP: 1.03 (ref 1–1.03)
SQUAMOUS #/AREA URNS HPF: ABNORMAL /HPF
UROBILINOGEN UR STRIP-MCNC: 0.2 MG/DL
WBC # BLD: 6.5 K/MCL (ref 4.2–11)
WBC #/AREA URNS HPF: ABNORMAL /HPF

## 2024-03-20 PROCEDURE — 99283 EMERGENCY DEPT VISIT LOW MDM: CPT | Performed by: EMERGENCY MEDICINE

## 2024-03-20 PROCEDURE — 96374 THER/PROPH/DIAG INJ IV PUSH: CPT

## 2024-03-20 PROCEDURE — 81001 URINALYSIS AUTO W/SCOPE: CPT | Performed by: EMERGENCY MEDICINE

## 2024-03-20 PROCEDURE — 93975 VASCULAR STUDY: CPT

## 2024-03-20 PROCEDURE — 87077 CULTURE AEROBIC IDENTIFY: CPT | Performed by: EMERGENCY MEDICINE

## 2024-03-20 PROCEDURE — 87491 CHLMYD TRACH DNA AMP PROBE: CPT | Performed by: STUDENT IN AN ORGANIZED HEALTH CARE EDUCATION/TRAINING PROGRAM

## 2024-03-20 PROCEDURE — 10002800 HB RX 250 W HCPCS: Performed by: STUDENT IN AN ORGANIZED HEALTH CARE EDUCATION/TRAINING PROGRAM

## 2024-03-20 RX ADMIN — MORPHINE SULFATE 2 MG: 2 INJECTION, SOLUTION INTRAMUSCULAR; INTRAVENOUS at 04:12

## 2024-03-20 ASSESSMENT — ENCOUNTER SYMPTOMS
RHINORRHEA: 0
VOMITING: 0
HEADACHES: 0
SHORTNESS OF BREATH: 0
WEAKNESS: 0
COUGH: 0
COLOR CHANGE: 0
AGITATION: 0
SORE THROAT: 0
DIZZINESS: 0
DIARRHEA: 0
CONFUSION: 0
BACK PAIN: 0
NAUSEA: 0
CHILLS: 0
ABDOMINAL PAIN: 0
FEVER: 0

## 2024-03-21 LAB — BACTERIA UR CULT: ABNORMAL

## 2024-07-11 ENCOUNTER — OFFICE VISIT (OUTPATIENT)
Dept: FAMILY MEDICINE CLINIC | Facility: CLINIC | Age: 34
End: 2024-07-11
Payer: COMMERCIAL

## 2024-07-11 VITALS
BODY MASS INDEX: 21.89 KG/M2 | HEIGHT: 64.5 IN | RESPIRATION RATE: 16 BRPM | TEMPERATURE: 99 F | HEART RATE: 76 BPM | WEIGHT: 129.81 LBS | SYSTOLIC BLOOD PRESSURE: 90 MMHG | DIASTOLIC BLOOD PRESSURE: 56 MMHG

## 2024-07-11 DIAGNOSIS — Z3A.10 10 WEEKS GESTATION OF PREGNANCY (HCC): Primary | ICD-10-CM

## 2024-07-11 LAB
CONTROL LINE PRESENT WITH A CLEAR BACKGROUND (YES/NO): YES YES/NO
KIT LOT #: NORMAL NUMERIC
PREGNANCY TEST, URINE: POSITIVE

## 2024-07-11 PROCEDURE — 3078F DIAST BP <80 MM HG: CPT | Performed by: STUDENT IN AN ORGANIZED HEALTH CARE EDUCATION/TRAINING PROGRAM

## 2024-07-11 PROCEDURE — 81025 URINE PREGNANCY TEST: CPT | Performed by: STUDENT IN AN ORGANIZED HEALTH CARE EDUCATION/TRAINING PROGRAM

## 2024-07-11 PROCEDURE — 3074F SYST BP LT 130 MM HG: CPT | Performed by: STUDENT IN AN ORGANIZED HEALTH CARE EDUCATION/TRAINING PROGRAM

## 2024-07-11 PROCEDURE — 99204 OFFICE O/P NEW MOD 45 MIN: CPT | Performed by: STUDENT IN AN ORGANIZED HEALTH CARE EDUCATION/TRAINING PROGRAM

## 2024-07-11 PROCEDURE — 3008F BODY MASS INDEX DOCD: CPT | Performed by: STUDENT IN AN ORGANIZED HEALTH CARE EDUCATION/TRAINING PROGRAM

## 2024-07-11 NOTE — PROGRESS NOTES
[facility]    Chief Complaint   Patient presents with    [facility]     New Patient    Pregnancy     Home pregnancy test 24 positive. Had test at [employer]. Also has had an US.          HPI:   Amanda Ayers is 33 year old patient presenting for the followin. Pregnant patient, positive upreg in our office, and home test reportedly positive starting 24 - LMP was 24 >> 10w1d by LMP. Today reports no spotting or bleeding. Sometimes has lower abdominal cramping whic his described as mild. Drinking plenty of water.  Having some nausea and low energy.    Denies medical problems or current medications.  This is her first pregnancy    PMH:  There is no problem list on file for this patient.    No past medical history on file.       SH: reviewed     FH: reviewed        ROS: full 10 point review of systems done and otherwise negative.      Healthcare Maintenance:  Health Maintenance   Topic Date Due    Annual Physical  Never done    DTaP,Tdap,and Td Vaccines (1 - Tdap) Never done    Pap Smear  Never done    COVID-19 Vaccine ( -  season) Never done    Annual Depression Screening  Never done    Influenza Vaccine (1) 10/01/2024    Pneumococcal Vaccine: Birth to 64yrs  Aged Out          PE:  Vital Signs    24 1332   BP: 90/56   Pulse: 76   Resp: 16   Temp: 98.5 °F (36.9 °C)     Wt Readings from Last 3 Encounters:   24 129 lb 12.8 oz (58.9 kg)     Body mass index is 21.94 kg/m².     Physical Exam:  GEN: Well-appearing, NAD, nontoxic  HEENT: NC/AT, PERRL, EOMI, TM without erythema or bulging bilaterally and normal ear canals, no nasal polyps, oropharynx clear without erythema or exudate, MMM  CARD: RRR, no m/r/g  PULM: CTA shahla  ABD: soft, nt, nd  EXT: No gross deformity  NEURO: no gross deficit  PSYCH: normal affect, thought process linear     No visits with results within 4 Week(s) from this visit.   Latest known visit with results is:   No results found for any previous  visit.        A/P: Amanda Ayers is 33 year old presenting for the followin. 10 weeks gestation of pregnancy ([facility]) - PNC today including start prenatal vitamin with iron. Gyn referral placed. Recommend tylenol, no ibuprofen when medication needed for headaches. Eat small frequent snacks to help with nausea. Avoid undercooked meats, unpasteurized cheese, etc.  - OBG - INTERNAL  - Urine Preg Test          No outpatient encounter medications on file as of 2024.     No facility-administered encounter medications on file as of 2024.           Side effects, risks and benefits of medications were explained.  The patient or responsible adult showed the ability to learn, asked appropriate questions.  There were no barriers to learning and they verbalized understanding of the treatment plan.     Medication list provided to patient and /or family member.    I spent a total of 30 minutes in both face-to-face and non-face-to-face activities for this visit on the date of this encounter.      Ericka Noriega MD

## 2024-07-17 ENCOUNTER — ULTRASOUND ENCOUNTER (OUTPATIENT)
Facility: CLINIC | Age: 34
End: 2024-07-17
Payer: COMMERCIAL

## 2024-07-17 ENCOUNTER — INITIAL PRENATAL (OUTPATIENT)
Facility: CLINIC | Age: 34
End: 2024-07-17
Payer: COMMERCIAL

## 2024-07-17 ENCOUNTER — TELEPHONE (OUTPATIENT)
Facility: CLINIC | Age: 34
End: 2024-07-17

## 2024-07-17 VITALS
DIASTOLIC BLOOD PRESSURE: 68 MMHG | WEIGHT: 128.81 LBS | HEIGHT: 64.5 IN | SYSTOLIC BLOOD PRESSURE: 110 MMHG | BODY MASS INDEX: 21.72 KG/M2 | HEART RATE: 87 BPM

## 2024-07-17 DIAGNOSIS — Z34.00 SUPERVISION OF NORMAL FIRST PREGNANCY, ANTEPARTUM (HCC): Primary | ICD-10-CM

## 2024-07-17 DIAGNOSIS — Z13.79 GENETIC SCREENING: ICD-10-CM

## 2024-07-17 DIAGNOSIS — Z12.4 CERVICAL CANCER SCREENING: ICD-10-CM

## 2024-07-17 LAB
APPEARANCE: CLEAR
BILIRUBIN: NEGATIVE
GLUCOSE (URINE DIPSTICK): NEGATIVE MG/DL
KETONES (URINE DIPSTICK): NEGATIVE MG/DL
MULTISTIX LOT#: ABNORMAL NUMERIC
NITRITE, URINE: NEGATIVE
PH, URINE: 7 (ref 4.5–8)
PROTEIN (URINE DIPSTICK): NEGATIVE MG/DL
SPECIFIC GRAVITY: 1.01 (ref 1–1.03)
URINE-COLOR: YELLOW
UROBILINOGEN,SEMI-QN: 0.2 MG/DL (ref 0–1.9)

## 2024-07-17 PROCEDURE — 87086 URINE CULTURE/COLONY COUNT: CPT

## 2024-07-17 PROCEDURE — 87591 N.GONORRHOEAE DNA AMP PROB: CPT

## 2024-07-17 PROCEDURE — 88175 CYTOPATH C/V AUTO FLUID REDO: CPT

## 2024-07-17 PROCEDURE — 87491 CHLMYD TRACH DNA AMP PROBE: CPT

## 2024-07-17 PROCEDURE — 87624 HPV HI-RISK TYP POOLED RSLT: CPT

## 2024-07-17 PROCEDURE — 76801 OB US < 14 WKS SINGLE FETUS: CPT | Performed by: OBSTETRICS & GYNECOLOGY

## 2024-07-17 PROCEDURE — 81002 URINALYSIS NONAUTO W/O SCOPE: CPT

## 2024-07-17 RX ORDER — PROCHLORPERAZINE MALEATE 10 MG
10 TABLET ORAL EVERY 6 HOURS PRN
Qty: 30 TABLET | Refills: 0 | Status: SHIPPED | OUTPATIENT
Start: 2024-07-17

## 2024-07-17 RX ORDER — CHOLECALCIFEROL (VITAMIN D3) 25 MCG
1 TABLET,CHEWABLE ORAL DAILY
COMMUNITY

## 2024-07-17 NOTE — TELEPHONE ENCOUNTER
Patients name &  verified with patient   Lab tubes labeled   NIPT and carrier screen lab drawn  Patient tolerated well. Test given to PSR for processing and send out.   Jan information given and patient instructed to call in 7-10 days to discuss results. Patient verbalized understanding.

## 2024-07-17 NOTE — PROGRESS NOTES
Initial OB - 11w0d     TYLER by LMP c/w 11w0d US     OBhx -    PMHx - . Denies blood transfusion HIV, hepatitis, HSV, VTE  Psurghx - denies  Last pap has never had one, Pap done today     33 year old , EDC by LMP   -NIPT & Carrier discussed - drawn today       N&V   -compazine Rx sent to pharmacy      Heartburn   -discussed Tums, Pepcid       RTC in 4 wks , next appt with OBs

## 2024-07-18 LAB
C TRACH DNA SPEC QL NAA+PROBE: NEGATIVE
HPV E6+E7 MRNA CVX QL NAA+PROBE: NEGATIVE
N GONORRHOEA DNA SPEC QL NAA+PROBE: NEGATIVE

## 2024-07-20 ENCOUNTER — LAB ENCOUNTER (OUTPATIENT)
Dept: LAB | Facility: REFERENCE LAB | Age: 34
End: 2024-07-20
Payer: COMMERCIAL

## 2024-07-20 DIAGNOSIS — Z34.00 SUPERVISION OF NORMAL FIRST PREGNANCY, ANTEPARTUM (HCC): ICD-10-CM

## 2024-07-20 LAB
ANTIBODY SCREEN: NEGATIVE
BASOPHILS # BLD AUTO: 0.02 X10(3) UL (ref 0–0.2)
BASOPHILS NFR BLD AUTO: 0.2 %
DEPRECATED RDW RBC AUTO: 40.1 FL (ref 35.1–46.3)
EOSINOPHIL # BLD AUTO: 0.09 X10(3) UL (ref 0–0.7)
EOSINOPHIL NFR BLD AUTO: 1.1 %
ERYTHROCYTE [DISTWIDTH] IN BLOOD BY AUTOMATED COUNT: 12.4 % (ref 11–15)
HBV SURFACE AG SER-ACNC: 0.25 [IU]/L
HBV SURFACE AG SERPL QL IA: NONREACTIVE
HCT VFR BLD AUTO: 38.2 %
HCV AB SERPL QL IA: NONREACTIVE
HGB BLD-MCNC: 12.8 G/DL
IMM GRANULOCYTES # BLD AUTO: 0.03 X10(3) UL (ref 0–1)
IMM GRANULOCYTES NFR BLD: 0.4 %
LYMPHOCYTES # BLD AUTO: 1.77 X10(3) UL (ref 1–4)
LYMPHOCYTES NFR BLD AUTO: 20.9 %
MCH RBC QN AUTO: 29.8 PG (ref 26–34)
MCHC RBC AUTO-ENTMCNC: 33.5 G/DL (ref 31–37)
MCV RBC AUTO: 88.8 FL
MONOCYTES # BLD AUTO: 0.46 X10(3) UL (ref 0.1–1)
MONOCYTES NFR BLD AUTO: 5.4 %
NEUTROPHILS # BLD AUTO: 6.11 X10 (3) UL (ref 1.5–7.7)
NEUTROPHILS # BLD AUTO: 6.11 X10(3) UL (ref 1.5–7.7)
NEUTROPHILS NFR BLD AUTO: 72 %
PLATELET # BLD AUTO: 203 10(3)UL (ref 150–450)
RBC # BLD AUTO: 4.3 X10(6)UL
RH BLOOD TYPE: POSITIVE
RUBV IGG SER QL: POSITIVE
RUBV IGG SER-ACNC: 158 IU/ML (ref 10–?)
T PALLIDUM AB SER QL IA: NONREACTIVE
WBC # BLD AUTO: 8.5 X10(3) UL (ref 4–11)

## 2024-07-20 PROCEDURE — 86762 RUBELLA ANTIBODY: CPT

## 2024-07-20 PROCEDURE — 87340 HEPATITIS B SURFACE AG IA: CPT

## 2024-07-20 PROCEDURE — 86803 HEPATITIS C AB TEST: CPT

## 2024-07-20 PROCEDURE — 86900 BLOOD TYPING SEROLOGIC ABO: CPT

## 2024-07-20 PROCEDURE — 87389 HIV-1 AG W/HIV-1&-2 AB AG IA: CPT

## 2024-07-20 PROCEDURE — 86780 TREPONEMA PALLIDUM: CPT

## 2024-07-20 PROCEDURE — 85025 COMPLETE CBC W/AUTO DIFF WBC: CPT

## 2024-07-20 PROCEDURE — 36415 COLL VENOUS BLD VENIPUNCTURE: CPT

## 2024-07-20 PROCEDURE — 86901 BLOOD TYPING SEROLOGIC RH(D): CPT

## 2024-07-20 PROCEDURE — 86850 RBC ANTIBODY SCREEN: CPT

## 2024-07-22 LAB
.: NORMAL
.: NORMAL

## 2024-07-25 ENCOUNTER — TELEPHONE (OUTPATIENT)
Facility: CLINIC | Age: 34
End: 2024-07-25

## 2024-07-26 ENCOUNTER — NURSE ONLY (OUTPATIENT)
Facility: CLINIC | Age: 34
End: 2024-07-26
Payer: COMMERCIAL

## 2024-07-26 DIAGNOSIS — O36.80X0 PREGNANCY WITH INCONCLUSIVE FETAL VIABILITY, SINGLE OR UNSPECIFIED FETUS (HCC): Primary | ICD-10-CM

## 2024-07-31 ENCOUNTER — TELEPHONE (OUTPATIENT)
Facility: CLINIC | Age: 34
End: 2024-07-31

## 2024-08-06 ENCOUNTER — TELEPHONE (OUTPATIENT)
Dept: FAMILY MEDICINE CLINIC | Facility: CLINIC | Age: 34
End: 2024-08-06

## 2024-08-06 ENCOUNTER — TELEPHONE (OUTPATIENT)
Facility: CLINIC | Age: 34
End: 2024-08-06

## 2024-08-06 RX ORDER — PANTOPRAZOLE SODIUM 40 MG/1
40 TABLET, DELAYED RELEASE ORAL
Qty: 60 TABLET | Refills: 3 | Status: SHIPPED | OUTPATIENT
Start: 2024-08-06

## 2024-08-06 NOTE — TELEPHONE ENCOUNTER
13 6/7 heartburn, difficulty swallowing.    24 From Initial OB visit recommendation:  N/V Rx companzine  Heartburn Tums/pepcid    Onset 2 weeks ago     Epigastric burning pain 6 /10 on pain scale after meals for 10 days regardless of what food she eats . Patient takes Pepcid twice daily before eating, started meds 3 days ago. She sits down at least 30-60 mins. Pc meals. She also takes Mylanta twice daily when she feels heartburn. Per pt she has difficulty swallowing, feels that there's something in her throat when she swallows whenever she eats fruits. Denies n/v, does not take compazine.     Try bland diet. Advised to follow up with PRIMARY CARE PROVIDER or urgent care.   Patient verbalized understanding, agreed to and intend to comply with plan of care.

## 2024-08-06 NOTE — TELEPHONE ENCOUNTER
Discussed Dr. Mota's recommendation RX protonix if pepcid not helping enough, rx ordered, Take 1 tablet (40 mg total) by mouth 2 (two) times daily before meals. - Oral. Patient verbalized understanding, agreed to and intend to comply with plan of care.

## 2024-08-06 NOTE — TELEPHONE ENCOUNTER
Called and LMOM that she needs to be seen in the office Dr Noriega currently has an opening for tomorrow that they could come in for.

## 2024-08-06 NOTE — TELEPHONE ENCOUNTER
Pt is calling she is having trouble any time she eats it is very hard for her to swallow solids and feels like a blockage. She is ok with water but not juice or any other liquid. She is in her first trimester and her OB/Gyne told her to call Dr. Noriega.

## 2024-08-07 ENCOUNTER — OFFICE VISIT (OUTPATIENT)
Dept: FAMILY MEDICINE CLINIC | Facility: CLINIC | Age: 34
End: 2024-08-07
Payer: COMMERCIAL

## 2024-08-07 VITALS
SYSTOLIC BLOOD PRESSURE: 110 MMHG | OXYGEN SATURATION: 98 % | RESPIRATION RATE: 16 BRPM | BODY MASS INDEX: 22.33 KG/M2 | DIASTOLIC BLOOD PRESSURE: 64 MMHG | TEMPERATURE: 97 F | HEART RATE: 88 BPM | HEIGHT: 64.5 IN | WEIGHT: 132.38 LBS

## 2024-08-07 DIAGNOSIS — R09.A2 GLOBUS SENSATION: Primary | ICD-10-CM

## 2024-08-07 PROCEDURE — 3008F BODY MASS INDEX DOCD: CPT | Performed by: NURSE PRACTITIONER

## 2024-08-07 PROCEDURE — 99213 OFFICE O/P EST LOW 20 MIN: CPT | Performed by: NURSE PRACTITIONER

## 2024-08-07 PROCEDURE — 3078F DIAST BP <80 MM HG: CPT | Performed by: NURSE PRACTITIONER

## 2024-08-07 PROCEDURE — 3074F SYST BP LT 130 MM HG: CPT | Performed by: NURSE PRACTITIONER

## 2024-08-07 NOTE — PROGRESS NOTES
Chief Complaint   Patient presents with    Heartburn       HPI:  Presents with  with approx 2 week history of globus sensation and abd discomfort after eating. Symptoms started after she started taking prenatal vital. Denies fever/chills, nausea, emesis, diarrhea, bloody/dark tarry stools, BRBPR. Is currently approx 14 wks pregnant. Notified ob of this. Was started on pantoprazole 40mg BID. Reports started this yesterday and reports she notes some improvement already. Denies fever/chills, cough, nasal/sinus congestion, ear pain/pressure.    Past Medical History:    Screening for genetic disease carrier status    Carrier Screen = NEGATIVE FOR 14 OUT OF 14 DISEASES       There is no problem list on file for this patient.      Current Outpatient Medications   Medication Sig Dispense Refill    pantoprazole 40 MG Oral Tab EC Take 1 tablet (40 mg total) by mouth 2 (two) times daily before meals. 60 tablet 3    prenatal vitamin with DHA 27-0.8-228 MG Oral Cap Take 1 capsule by mouth daily.      prochlorperazine (COMPAZINE) 10 mg tablet Take 1 tablet (10 mg total) by mouth every 6 (six) hours as needed for Nausea. 30 tablet 0       Physical Exam  /64   Pulse 88   Temp 96.8 °F (36 °C)   Resp 16   Ht 5' 4.5\" (1.638 m)   Wt 132 lb 6.4 oz (60.1 kg)   LMP 05/01/2024 (Exact Date)   SpO2 98%   BMI 22.38 kg/m²   Constitutional: well developed, well nourished, in no apparent distress  HEENT: Normocephalic and atraumatic. Tympanic membranes clear bilat. Oropharynx is erythematous without lesions.   Eyes: Conjunctivae are pink and moist without exudate or drainage.  Lymphadenopathy: No cervical adenopathy.   Cardiovascular: Normal rate, regular rhythm.  No murmur.   Pulmonary/Chest: No respiratory distress. Effort normal. Breath sounds clear bilaterally. No wheezes, rhonchi or rales  Abd: soft, non-distended, non-TTP, w/o guarding, rebound, masses or appreciable organomegaly. BS(+)x4- normoactive.  Skin: Skin is  warm and dry. No rash noted. No erythema. No pallor.     A/P:    Encounter Diagnosis   Name Primary?    Globus sensation- ok to continue pantoprazole BID for now. Discussed symptoms should slowly resolve in next 10-14 days but complete healing of esophagus will take as long as 8 weeks. Discussed eating small frequent meals every 3-4 hours. Discussed should ask OB if they are agreeable to reducing dose to 40mg once daily at appointment next week, and continuing that dose for 3 weeks. Then could consider dose reduction to 20mg if symptoms remain controlled. Verbalized understanding of instructions and agreeable to this plan of care.    Yes       No orders of the defined types were placed in this encounter.      Meds & Refills for this Visit:  Requested Prescriptions      No prescriptions requested or ordered in this encounter       Imaging & Consults:  None    No follow-ups on file.  There are no Patient Instructions on file for this visit.    All questions were answered and the patient understands the plan.

## 2024-08-15 ENCOUNTER — ROUTINE PRENATAL (OUTPATIENT)
Facility: CLINIC | Age: 34
End: 2024-08-15
Payer: COMMERCIAL

## 2024-08-15 VITALS
HEIGHT: 64.5 IN | WEIGHT: 133 LBS | SYSTOLIC BLOOD PRESSURE: 102 MMHG | BODY MASS INDEX: 22.43 KG/M2 | DIASTOLIC BLOOD PRESSURE: 52 MMHG | HEART RATE: 86 BPM

## 2024-08-15 DIAGNOSIS — Z34.02 ENCOUNTER FOR SUPERVISION OF NORMAL FIRST PREGNANCY IN SECOND TRIMESTER (HCC): Primary | ICD-10-CM

## 2024-08-15 DIAGNOSIS — Z3A.15 15 WEEKS GESTATION OF PREGNANCY (HCC): ICD-10-CM

## 2024-08-15 PROCEDURE — 3008F BODY MASS INDEX DOCD: CPT | Performed by: OBSTETRICS & GYNECOLOGY

## 2024-08-15 PROCEDURE — 3074F SYST BP LT 130 MM HG: CPT | Performed by: OBSTETRICS & GYNECOLOGY

## 2024-08-15 PROCEDURE — 3078F DIAST BP <80 MM HG: CPT | Performed by: OBSTETRICS & GYNECOLOGY

## 2024-08-15 NOTE — PROGRESS NOTES
Patient stopped taking PNV because of heart burn, taking Protonix - somewhat better, advised to restart PNV  Patient also noticed decreased appetite, reassured, no weight loss  - 20wk US next visit     EDC by LMP c/w 11w3d US  -NIPT & Carrier neg Girl      Heartburn   -protonix

## 2024-09-20 ENCOUNTER — ULTRASOUND ENCOUNTER (OUTPATIENT)
Facility: CLINIC | Age: 34
End: 2024-09-20
Payer: COMMERCIAL

## 2024-09-20 ENCOUNTER — ROUTINE PRENATAL (OUTPATIENT)
Facility: CLINIC | Age: 34
End: 2024-09-20
Payer: COMMERCIAL

## 2024-09-20 VITALS
HEART RATE: 92 BPM | HEIGHT: 64.5 IN | SYSTOLIC BLOOD PRESSURE: 96 MMHG | DIASTOLIC BLOOD PRESSURE: 54 MMHG | WEIGHT: 137.81 LBS | BODY MASS INDEX: 23.24 KG/M2

## 2024-09-20 DIAGNOSIS — Z36.2 ENCOUNTER FOR FOLLOW-UP ULTRASOUND OF FETAL ANATOMY (HCC): ICD-10-CM

## 2024-09-20 DIAGNOSIS — Z3A.20 20 WEEKS GESTATION OF PREGNANCY (HCC): ICD-10-CM

## 2024-09-20 DIAGNOSIS — Z34.02 ENCOUNTER FOR SUPERVISION OF NORMAL FIRST PREGNANCY IN SECOND TRIMESTER (HCC): Primary | ICD-10-CM

## 2024-09-20 PROCEDURE — 3074F SYST BP LT 130 MM HG: CPT | Performed by: OBSTETRICS & GYNECOLOGY

## 2024-09-20 PROCEDURE — 3008F BODY MASS INDEX DOCD: CPT | Performed by: OBSTETRICS & GYNECOLOGY

## 2024-09-20 PROCEDURE — 3078F DIAST BP <80 MM HG: CPT | Performed by: OBSTETRICS & GYNECOLOGY

## 2024-09-20 PROCEDURE — 76805 OB US >/= 14 WKS SNGL FETUS: CPT | Performed by: OBSTETRICS & GYNECOLOGY

## 2024-09-20 NOTE — PROGRESS NOTES
Patient is taking gummy PNV, worried if she will add iron she might her heart burn back that she had with regular PNV - reassured, will add iron    - 20wk US NL but face not seen - f/u 2 weeks     EDC by LMP c/w 11w3d US  -NIPT & Carrier neg Girl      Heartburn   -protonix

## 2024-10-04 ENCOUNTER — ULTRASOUND ENCOUNTER (OUTPATIENT)
Facility: CLINIC | Age: 34
End: 2024-10-04
Payer: COMMERCIAL

## 2024-10-17 ENCOUNTER — ROUTINE PRENATAL (OUTPATIENT)
Facility: CLINIC | Age: 34
End: 2024-10-17
Payer: COMMERCIAL

## 2024-10-17 VITALS
BODY MASS INDEX: 24.45 KG/M2 | SYSTOLIC BLOOD PRESSURE: 106 MMHG | HEART RATE: 67 BPM | WEIGHT: 145 LBS | DIASTOLIC BLOOD PRESSURE: 62 MMHG | HEIGHT: 64.5 IN

## 2024-10-17 DIAGNOSIS — Z23 NEED FOR VACCINATION: ICD-10-CM

## 2024-10-17 DIAGNOSIS — Z3A.24 24 WEEKS GESTATION OF PREGNANCY (HCC): Primary | ICD-10-CM

## 2024-10-17 PROCEDURE — 90656 IIV3 VACC NO PRSV 0.5 ML IM: CPT

## 2024-10-17 PROCEDURE — 3078F DIAST BP <80 MM HG: CPT

## 2024-10-17 PROCEDURE — 3008F BODY MASS INDEX DOCD: CPT

## 2024-10-17 PROCEDURE — 90471 IMMUNIZATION ADMIN: CPT

## 2024-10-17 PROCEDURE — 3074F SYST BP LT 130 MM HG: CPT

## 2024-10-17 NOTE — PROGRESS NOTES
Casey 24w1d    When she woke up had pain on left side, lasted for 15 min, has not reoccurred.   -1 hr GTT & CBC discussed & ordered  -flu given today  -tdap discussed - ask at next visit       EDC by LMP c/w 11w3d US  -NIPT & Carrier neg Girl  -20 wk US, normal, but face not seen - f/u normal         Heartburn   -protonix

## 2024-11-02 ENCOUNTER — LAB ENCOUNTER (OUTPATIENT)
Dept: LAB | Facility: REFERENCE LAB | Age: 34
End: 2024-11-02
Payer: COMMERCIAL

## 2024-11-02 DIAGNOSIS — Z3A.24 24 WEEKS GESTATION OF PREGNANCY (HCC): ICD-10-CM

## 2024-11-02 LAB
BASOPHILS # BLD AUTO: 0.01 X10(3) UL (ref 0–0.2)
BASOPHILS NFR BLD AUTO: 0.1 %
DEPRECATED HBV CORE AB SER IA-ACNC: 21 NG/ML
DEPRECATED RDW RBC AUTO: 42.5 FL (ref 35.1–46.3)
EOSINOPHIL # BLD AUTO: 0.16 X10(3) UL (ref 0–0.7)
EOSINOPHIL NFR BLD AUTO: 1.8 %
ERYTHROCYTE [DISTWIDTH] IN BLOOD BY AUTOMATED COUNT: 13 % (ref 11–15)
GLUCOSE 1H P GLC SERPL-MCNC: 167 MG/DL
HCT VFR BLD AUTO: 33.2 %
HGB BLD-MCNC: 10.9 G/DL
IMM GRANULOCYTES # BLD AUTO: 0.12 X10(3) UL (ref 0–1)
IMM GRANULOCYTES NFR BLD: 1.3 %
LYMPHOCYTES # BLD AUTO: 1.39 X10(3) UL (ref 1–4)
LYMPHOCYTES NFR BLD AUTO: 15.2 %
MCH RBC QN AUTO: 29.9 PG (ref 26–34)
MCHC RBC AUTO-ENTMCNC: 32.8 G/DL (ref 31–37)
MCV RBC AUTO: 91 FL
MONOCYTES # BLD AUTO: 0.43 X10(3) UL (ref 0.1–1)
MONOCYTES NFR BLD AUTO: 4.7 %
NEUTROPHILS # BLD AUTO: 7.01 X10 (3) UL (ref 1.5–7.7)
NEUTROPHILS # BLD AUTO: 7.01 X10(3) UL (ref 1.5–7.7)
NEUTROPHILS NFR BLD AUTO: 76.9 %
PLATELET # BLD AUTO: 183 10(3)UL (ref 150–450)
RBC # BLD AUTO: 3.65 X10(6)UL
WBC # BLD AUTO: 9.1 X10(3) UL (ref 4–11)

## 2024-11-02 PROCEDURE — 82950 GLUCOSE TEST: CPT

## 2024-11-02 PROCEDURE — 82728 ASSAY OF FERRITIN: CPT

## 2024-11-02 PROCEDURE — 85025 COMPLETE CBC W/AUTO DIFF WBC: CPT

## 2024-11-02 PROCEDURE — 36415 COLL VENOUS BLD VENIPUNCTURE: CPT

## 2024-11-04 DIAGNOSIS — O99.810 ABNORMAL MATERNAL GLUCOSE TOLERANCE, ANTEPARTUM (HCC): Primary | ICD-10-CM

## 2024-11-05 ENCOUNTER — LABORATORY ENCOUNTER (OUTPATIENT)
Dept: LAB | Facility: HOSPITAL | Age: 34
End: 2024-11-05
Payer: COMMERCIAL

## 2024-11-05 DIAGNOSIS — O99.810 ABNORMAL MATERNAL GLUCOSE TOLERANCE, ANTEPARTUM (HCC): ICD-10-CM

## 2024-11-05 LAB
GLUCOSE 1H P GLC SERPL-MCNC: 148 MG/DL
GLUCOSE 2H P GLC SERPL-MCNC: 136 MG/DL
GLUCOSE 3H P GLC SERPL-MCNC: 107 MG/DL (ref 70–140)
GLUCOSE P FAST SERPL-MCNC: 78 MG/DL

## 2024-11-05 PROCEDURE — 36415 COLL VENOUS BLD VENIPUNCTURE: CPT

## 2024-11-05 PROCEDURE — 82951 GLUCOSE TOLERANCE TEST (GTT): CPT

## 2024-11-05 PROCEDURE — 82952 GTT-ADDED SAMPLES: CPT

## 2024-11-13 ENCOUNTER — ROUTINE PRENATAL (OUTPATIENT)
Facility: CLINIC | Age: 34
End: 2024-11-13
Payer: COMMERCIAL

## 2024-11-13 VITALS
BODY MASS INDEX: 25.23 KG/M2 | SYSTOLIC BLOOD PRESSURE: 100 MMHG | DIASTOLIC BLOOD PRESSURE: 60 MMHG | WEIGHT: 149.63 LBS | HEART RATE: 100 BPM | HEIGHT: 64.5 IN

## 2024-11-13 DIAGNOSIS — E61.1 IRON DEFICIENCY: ICD-10-CM

## 2024-11-13 DIAGNOSIS — O99.810 ABNORMAL GLUCOSE TOLERANCE TEST (GTT) DURING PREGNANCY, ANTEPARTUM (HCC): ICD-10-CM

## 2024-11-13 DIAGNOSIS — Z13.0 SCREENING, ANEMIA, DEFICIENCY, IRON: ICD-10-CM

## 2024-11-13 DIAGNOSIS — Z11.3 SCREENING EXAMINATION FOR STD (SEXUALLY TRANSMITTED DISEASE): ICD-10-CM

## 2024-11-13 DIAGNOSIS — O09.513 AMA (ADVANCED MATERNAL AGE) PRIMIGRAVIDA 35+, THIRD TRIMESTER (HCC): Primary | ICD-10-CM

## 2024-11-13 DIAGNOSIS — Z23 NEED FOR TDAP VACCINATION: ICD-10-CM

## 2024-11-13 PROCEDURE — 90715 TDAP VACCINE 7 YRS/> IM: CPT | Performed by: OBSTETRICS & GYNECOLOGY

## 2024-11-13 PROCEDURE — 3074F SYST BP LT 130 MM HG: CPT | Performed by: OBSTETRICS & GYNECOLOGY

## 2024-11-13 PROCEDURE — 3008F BODY MASS INDEX DOCD: CPT | Performed by: OBSTETRICS & GYNECOLOGY

## 2024-11-13 PROCEDURE — 3078F DIAST BP <80 MM HG: CPT | Performed by: OBSTETRICS & GYNECOLOGY

## 2024-11-13 PROCEDURE — 90471 IMMUNIZATION ADMIN: CPT | Performed by: OBSTETRICS & GYNECOLOGY

## 2024-11-13 NOTE — PATIENT INSTRUCTIONS
Advanced maternal age (age 35 or older at time of due date)  -increased risk of but not limited to fetal aneuploidy (chromosomal abnormality such as Down Syndrome), fetal growth abnormalities, need for  delivery with the risks of prematurity, stillbirth, gestational diabetes, hypertensive disorders of pregnancy, stroke, blood clot, heart attack, death.  -growth ultrasound with biophysical profile at 32 wk  -NST weekly at 36 wk     Aspirin in pregnancy  -81 mg tablet - take 1.5 or 2 tablets per day.   -may help prevent  pre-eclampsia by helping with placental development and function  -may discontinue at term (37 wk)     NST (non-stress test)   -This is a test of fetal well-being used to help prevent adverse outcomes including stillbirth   -Consists of your fetus being put on continuous monitor to record the heart rate pattern while you are resting   -Average time of the exam is about 20 minutes but can take longer if fetus is sleeping, mother is dehydrated or hasn't eaten recently.     *Please make sure you eat and come well hydrated to your NST appointments.   *It is also helpful to come at least 15 minutes early so you can be placed on the monitor as soon as you are roomed to get your test started  *The sooner you get on the monitor, hopefully the quicker we can get your test completed     Controlling weight gain in pregnancy:  Look at current diet - write down everything you eat for a few days. Count up your mg protein, grams of fiber, etc.   Protein - aim for at least 100 grams per day. [employer].com. Add plain (no herbal additives, etc) protein powder if needed. Whey, hemp, pea protein, etc all fine  Water - Aim for 1 gallon per day  Fiber rich foods. 25-28 grams per day.   Heartburn medication if needed   Avoid fast/simple carbohydrates (sodas) - this can spike your insulin levels & can trigger hypoglycemia which can cause ravenous hunger  Adequate sleep important for hormonal regulation of  appetite.   Avoid high sodium foods. Aim for potassium-rich foods. These will have a slight diuretic effect.   Walk for at least 15 minutes after every meal.     Basic sample meal plan:    Breakfast: 15-30 g carbs for breakfast  Mid morning snack (if hungry): 15 -30 g carb   Lunch: 45-60 g carb  Mid afternoon snack (if hungry): 15-30 g carb  Dinner: 45-60 g carb   Bedtime snack if hungry can be 15 g carb with some protein.  Snack should be between 8-10 pm       Iron supplementation   [ ] Add in ferrous sulfate 325 mg (65 mg elemental iron) tablet at least 4 hr apart from prenatal vitamin every other day at least 4 hr apart from prenatal vitamin & ideally with vitamin C containing foods (e.g. pomegranate) to increase absorption and avoiding calcium containing foods as this can decrease absorption.  [ ] Start vitamin B12 1000 mcg daily over the counter to help the bone marrow produce new red blood cells.  [ ] Iron can cause some constipation. Increase water & fiber (especially leafy greens.) Can add stool softener like colace (docusate) or Miralax if needed.  [ ] Recheck CBC at least 4 weeks after starting extra oral iron.      [facility] Prenatal Classes (and virtual tour of Labor & Delivery unit)  [email or web address]  [phone]    Pre-registration for the hospital  [email or web address]    Breast pump  -contact your insurance to request them to send an order to us for their preferred medical supply company  Or  -contact [employer] (flyer available on the lobby wall across from reception desk)   [email or web address]    Car seat *MUST* be installed before infant(s) can be leave the hospital    Doctor for baby   *Please select a pediatrician or family medicine provider BEFORE you are admitted to the hospital to give birth.   Pediatrics (birth-18 years of age) or Family Medicine (birth through adulthood)  Make sure that provider accepts your  insurance.   Pick a provider that is close to where you live.  If the provider is on staff at [facility], the nursing staff will notify them when your infant is born so they are aware to come to the hospital to examine the infant.   If the provider you have chosen is not on staff at [facility], a pediatric hospitalist will care for your infant during the hospitalization only. You must arrange the follow up visit with your provider.   After delivery at the hospital follow up visit instructions for baby will be provided prior to discharge.     The baby will not be able to leave the hospital without a follow up visit scheduled.     To establish care:  [email or web address]  Or   [facility] Physician Referral line at [phone]    There are MANY providers available. It would be impossible to list them all, but here are a few popular pediatrics groups in [place]:    [facility] [place] [phone]  [facility] [place] [phone]  [facility] [place] [phone]  [facility] [place] [phone]  [facility] [place] [phone]  [facility] [phone]    There are also many wonderful independent practitioners as well. We recommend you speak with family, friends, colleagues as personal recommendations can be very helpful.

## 2024-11-13 NOTE — PROGRESS NOTES
SHEKHAR 28w0d - visit #5    Chief Complaint   Patient presents with    Prenatal Care     +FM. No contractions, leaking fluid, vaginal bleeding, headache, vision changes, epigastric pain.     Scalp is itchy for 3 weeks.   No flakes seen   Dandruff shampoo helping a little     Vitals:    24 1204   BP: 100/60   Pulse: 100   Weight: 149 lb 9.6 oz (67.9 kg)   Height: 64.5\"      TWG 25 lb 9.6 oz (11.6 kg)      33 year old  at 28w0d   TYLER 25 by LMP c/w 11w3d US  A+    -NIPT & Carrier neg Girl  -20 wk US, normal, but face not seen - f/u normal   -3rd trimester HIV & syphilis discussed & ordered   -Flu  done  -Tdap done 24   -RSV vaccine discussed & info given. Encouraged to check insurance coverage  -classes, pre-registration, pediatrician, breast pump, car seat discussed     AMA (will be 34 at [facility])  -aspirin discussed  -growth US & BPP at 32 wk advised & ordered for our clinic  -NST weekly at 36 wk     Failed 1 hr GTT  - - 1 hr   - - 3 hr GTT passed  -cautioned about carbs in diet. Increase protein, fiber, walk 10-15 min after every meal    -A1c with next blood draw      Iron deficiency  - Hb 10.9, Ferritin 21  -extra oral iron advised - taking 3 times per week.   -recheck CBC 4 wk after starting extra iron     RTC 2 wk

## 2024-11-29 NOTE — PROGRESS NOTES
SHEKHAR 30w2d - visit #6    Chief Complaint   Patient presents with    Prenatal Care     Partner here   +FM. No contractions, leaking fluid, vaginal bleeding.   When she is lying on her left side she notices some inner thigh discomfort on the left. Walking is ok. Not needing PT at this time.     Vitals:    24 1351   BP: 100/60   Pulse: 95   Weight: 151 lb 3.2 oz (68.6 kg)   Height: 64.5\"     TWG 27 lb 3.2 oz (12.3 kg)      33 year old  at 30w2d   TYLER 25 by LMP c/w 11w3d US  A+    -NIPT & Carrier neg Girl  -20 wk US, normal, but face not seen - f/u normal   -3rd trimester HIV & syphilis discussed & ordered at previous. Reminded   -Flu - done  -Tdap done  -RSV vaccine discussed & info given. Encouraged to check insurance coverage. Offer at next   -classes, pre-registration, pediatrician, breast pump, car seat discussed at previous     AMA (will be 34 at [facility])  -aspirin discussed  -growth US & BPP at 32 wk advised & ordered for our clinic - has not scheduled. Message sent to  staff   -NST weekly at 36 wk - message to     Failed 1 hr GTT  - - 1 hr   - - 3 hr GTT passed  -cautioned about carbs in diet. Increase protein, fiber, walk 10-15 min after every meal    -A1c with next blood draw - already ordered.      Iron deficiency  - Hb 10.9, Ferritin 21  -extra oral iron advised - taking 3 times per week.   -recheck CBC 4 wk after starting extra iron - already ordered     RTC 2 wk

## 2024-11-29 NOTE — PATIENT INSTRUCTIONS
Growth ultrasound with biophysical profile at 32 wk in our clinic - get scheduled.     Lab orders in system.

## 2024-12-13 NOTE — PROGRESS NOTES
Patient has no complaints    TYLER 2/5/25 by LMP c/w 11w3d US      -NIPT & Carrier neg Girl  -20 wk US, normal, but face not seen - f/u normal   -3rd trimester HIV & syphilis done  -Flu 24-25 done  -Tdap done  -RSV today  -classes, pre-registration, pediatrician, breast pump, car seat discussed at previous     AMA (will be 34 at [facility])  -aspirin discussed  -growth US & BPP at 32 49%, cephalic, BPP 8/8  -NST weekly at 36 wk - message to     Failed 1 hr GTT  -11/2 - 1 hr   -11/5 - 3 hr GTT passed  -cautioned about carbs in diet. Increase protein, fiber, walk 10-15 min after every meal    -A1c 5.1 on 12/1/24     Iron deficiency  -11/2 Hb 10.9, Ferritin 21  -extra oral iron advised - taking 3 times per week.   -recheck CBC 4 wk after starting extra iron - Hb 11.7

## 2024-12-28 NOTE — PROGRESS NOTES
SHEKHAR 33.3  +FM, no LOF, no VB, no ctx   1 week ago had some pain in the central of her belly.  When she walks pain comes after 10-15 minutes   TYLER 2/5/25 by LMP c/w 11w3d US      -NIPT & Carrier neg Girl  -20 wk US, normal, but face not seen - f/u normal   -3rd trimester HIV & syphilis done  -Flu, Tdap, RSV done  -FOB: Hadi  -classes, pre-registration, pediatrician, breast pump, car seat discussed at previous     AMA (will be 34 at [facility])  -aspirin discussed  -growth US & BPP at 32 49%, cephalic, BPP 8/8  -NST weekly at 36 wk - message to     Failed 1 hr GTT  -11/2 - 1 hr   -11/5 - 3 hr GTT passed  -cautioned about carbs in diet. Increase protein, fiber, walk 10-15 min after every meal    -A1c 5.1 on 12/1/24     Iron deficiency  -11/2 Hb 10.9, Ferritin 21  -extra oral iron advised - taking 3 times per week.   -recheck CBC 4 wk after starting extra iron - Hb 11.7

## 2025-01-14 NOTE — PROGRESS NOTES
SHEKHAR 36w6d     Pt has no complaints, regular fetal movement  SVE - 3/70/-2 vertex      TYLER 2/5/25 by LMP c/w 11w3d US  -NIPT & Carrier neg Girl  -20 wk US, normal, but face not seen - f/u normal   -3rd trimester HIV & syphilis done  -Flu, Tdap, RSV done  -FOB: Hadi  -classes, pre-registration, pediatrician, breast pump, car seat discussed at previous     AMA (will be 34 at [facility])  -aspirin discussed  -growth US & BPP at 32 49%, cephalic, BPP 8/8  -NST weekly at 36 wk    Failed 1 hr GTT  -11/2 - 1 hr   -11/5 - 3 hr GTT passed  -cautioned about carbs in diet. Increase protein, fiber, walk 10-15 min after every meal    -A1c 5.1 on 12/1/24     Iron deficiency  -11/2 Hb 10.9, Ferritin 21  -extra oral iron advised - taking 3 times per week.   -recheck CBC 4 wk after starting extra iron - Hb 11.7

## 2025-01-21 NOTE — PROGRESS NOTES
Pt is a  at 37 6/7 wk iup who is brought to room triage-2 for ro ROM. Pt reports LOF since approx 0200 today. Pt reports irreg uc's since that time. Pt reports +fm and denies any VB. EFM tested and applied. PCO dicussed and questions answered.

## 2025-01-21 NOTE — H&P
[facility]   part of [facility]    History & Physical    Amanda Ayers Patient Status:  Outpatient    1990 MRN [id number]   Location [facility] LABOR & DELIVERY Attending Ольга Duarte,*   Hosp Day # 0 PCP Ericka Noriega MD     Date of Admission:  2025    HPI:   Amanda Ayers is a 34 year old  at 37w6d presenting for labor with gross rupture of membranes     Her current obstetrical history is significant for:  - advanced maternal aged  - failed 1 hour (passed 3 hour)  - iron deficiency    0200 had SROM.  5 cm on presentation with painful contractions and gross rupture.     History   Obstetric History:   OB History    Para Term  AB Living   1             SAB IAB Ectopic Multiple Live Births                  # Outcome Date GA Lbr Dominick/2nd Weight Sex Type Anes PTL Lv   1 Current               Obstetric Comments   Current - AMA, iron deficiency, failed 1 hr GTT passed 3 hr GTT      Past Medical History:   Past Medical History:    Anemia    Iron deficiency    Screening for genetic disease carrier status    Carrier Screen = NEGATIVE FOR 14 OUT OF 14 DISEASES     Past Social History: History reviewed. No pertinent surgical history.  Family History:   Family History   Problem Relation Age of Onset    Diabetes Neg      Social History:   Social History     Tobacco Use    Smoking status: Never    Smokeless tobacco: Never   Substance Use Topics    Alcohol use: Not Currently        Allergies/Medications:   Allergies:   Allergies[1]  Medications:  Prescriptions Prior to Admission[2]    Review of Systems:   As documented in HPI, otherwise negative    Physical Exam:   Temp:  [98.6 °F (37 °C)] 98.6 °F (37 °C)  Pulse:  [94] 94  Resp:  [15] 15  BP: (131)/(71) 131/71    Constitutional: well appearing  Respiratory: no increased WOB, not using accessory muscles  Cardiac: regular rate  Abdomen: nontender  CEFM: 140/moderate/+accels/no decels cat 1   SVE: 5/90/-2 at 0833 with gross ROM  clear fluid    Results:     Lab Results   Component Value Date    TREPONEMALAB Nonreactive 2024    ABO A 2024    RH Positive 2024    WBC 9.7 2024    HGB 11.7 (L) 2024    HCT 34.5 (L) 2024    .0 2024       Lab Results   Component Value Date    SPECGRAVITY 1.015 2024    NITRITE Negative 2024       [unfilled]    US:    Assessment/Plan:   Amanda Ayers is a 34 year old  at 37w6d presenting for labor    #Labor  - SROM clear fluid at 0200  - Admit SVE: /-2 at 0833, SROM clear  - Pelvis not proven.  Determined to be adequate  - OB hemorrhage risk: med.  Starting HgB: 11.7  - Plan pitocin if stalls out  - Plan epidural for pain management    #Fetal well being  - CEFM: cat 1   - Last US:49th percentile at 32 weeks  - Presentation: Vertex    # GBS status: neg    Ольга Duarte MD  2025  8:59 AM    Note to patient and family   The 21st Century Cures Act makes medical notes available to patients in the interest of transparency.  However, please be advised that this is a medical document.  It is intended as [id number]-[id number]-urbf communication.  It is written and medical language may contain abbreviations or verbiage that are technical and unfamiliar.  It may appear blunt or direct.  Medical documents are intended to carry relevant information, facts as evident, and the clinical opinion of the practitioner.            [1] No Known Allergies  [2]   Medications Prior to Admission   Medication Sig Dispense Refill Last Dose/Taking    IRON OR 65 mg.       prenatal vitamin with DHA 27-0.8-228 MG Oral Cap Take 1 capsule by mouth daily.

## 2025-01-21 NOTE — ANESTHESIA PROCEDURE NOTES
Labor Analgesia    Date/Time: 1/21/2025 12:59 PM    Performed by: Jonathan Calixto MD  Authorized by: Jonathan Calixto MD      General Information and Staff    Start Time:  1/21/2025 12:59 PM  End Time:  1/21/2025 1:12 PM  Anesthesiologist:  Jonathan Calixto MD  Performed by:  Anesthesiologist  Patient Location:  OB  Site Identification: surface landmarks    Reason for Block: labor epidural    Preanesthetic Checklist: patient identified, IV checked, risks and benefits discussed, monitors and equipment checked, pre-op evaluation, timeout performed, IV bolus, anesthesia consent and sterile technique used      Procedure Details    Patient Position:  Sitting  Prep: Betadine    Monitoring:  Heart rate and continuous pulse ox  Approach:  Midline    Epidural Needle    Injection Technique:  PEACE air  Needle Type:  Tuohy  Needle Gauge:  17 G  Needle Length:  3.375 in  Needle Insertion Depth:  4  Location:  L3-4    Spinal Needle      Catheter    Catheter Type:  End hole  Catheter Size:  19 G  Catheter at Skin Depth:  9.5  Test Dose:  Negative    Assessment      Additional Comments

## 2025-01-21 NOTE — ANESTHESIA PREPROCEDURE EVALUATION
PRE-OP EVALUATION    Patient Name: Amanda Ayers    Admit Diagnosis: Pregnancy (HCC) [Z34.90]    Pre-op Diagnosis: * No pre-op diagnosis entered *        Anesthesia Procedure: LABOR ANALGESIA    * No surgeons found in log *    Pre-op vitals reviewed.  Temp: 98.4 °F (36.9 °C)  Pulse: 85  Resp: 15  BP: 111/66     Body mass index is 27.83 kg/m².    Current medications reviewed.  Hospital Medications:  • lactated ringers infusion   Intravenous Continuous   • dextrose in lactated ringers 5% infusion   Intravenous PRN   • lactated ringers IV bolus 500 mL  500 mL Intravenous PRN   • acetaminophen (Tylenol Extra Strength) tab 500 mg  500 mg Oral Q6H PRN   • acetaminophen (Tylenol Extra Strength) tab 1,000 mg  1,000 mg Oral Q6H PRN   • ibuprofen (Motrin) tab 600 mg  600 mg Oral Once PRN   • ondansetron (Zofran) 4 MG/2ML injection 4 mg  4 mg Intravenous Q6H PRN   • oxyTOCIN in sodium chloride 0.9% (Pitocin) 30 Units/500mL infusion premix  62.5-900 leesa-units/min Intravenous Continuous   • terbutaline (Brethine) 1 MG/ML injection 0.25 mg  0.25 mg Subcutaneous PRN   • sodium citrate-citric acid (Bicitra) 500-334 MG/5ML oral solution 30 mL  30 mL Oral PRN   • oxyTOCIN in sodium chloride 0.9% (Pitocin) 30 Units/500mL infusion premix  0.5-20 leesa-units/min Intravenous Continuous   • lactated ringers IV bolus 1,000 mL  1,000 mL Intravenous Once   • fentaNYL-bupivacaine 2 mcg/mL-0.125% in sodium chloride 0.9% 100 mL EPIDURAL infusion premix  12 mL/hr Epidural Continuous   • fentaNYL (Sublimaze) 50 mcg/mL injection 100 mcg  100 mcg Epidural Once   • lidocaine 1.5%-EPINEPHrine 1:200,000 (Xylocaine-Epinephrine) injection  5 mL Injection PRN   • bupivacaine PF (Marcaine) 0.25% injection  30 mL Injection PRN   • lidocaine PF (Xylocaine-MPF) 2% injection  5 mL Injection PRN   • sodium chloride 0.9% PF injection 10 mL  10 mL Injection PRN   • ePHEDrine (PF) 25 MG/5 ML injection 5 mg  5 mg Intravenous PRN   • nalbuphine (Nubain) 10 mg/mL  injection 2.5 mg  2.5 mg Intravenous Q15 Min PRN   • sodium chloride 0.9% PF 0.9% injection       • fentaNYL-bupivacaine in sodium chloride 0.9% 2 mcg/mL-0.125% EPIDURAL infusion premix       • ePHEDrine (PF) 25 MG/5 ML injection           Outpatient Medications:   Prescriptions Prior to Admission[1]    Allergies: Patient has no known allergies.      Anesthesia Evaluation        Anesthetic Complications           GI/Hepatic/Renal                                 Cardiovascular                                                       Endo/Other                                  Pulmonary                           Neuro/Psych                              Term iup    History reviewed. No pertinent surgical history.  Social History     Socioeconomic History   • Marital status:    Tobacco Use   • Smoking status: Never   • Smokeless tobacco: Never   Substance and Sexual Activity   • Alcohol use: Not Currently   • Drug use: Never   Other Topics Concern   • Caffeine Concern No   • Stress Concern No   • Weight Concern No   • Special Diet No   • Exercise No   • Seat Belt No     History   Drug Use Unknown     Available pre-op labs reviewed.  Lab Results   Component Value Date    WBC 9.6 01/21/2025    RBC 4.19 01/21/2025    HGB 13.2 01/21/2025    HCT 38.0 01/21/2025    MCV 90.7 01/21/2025    MCH 31.5 01/21/2025    MCHC 34.7 01/21/2025    RDW 13.5 01/21/2025    .0 01/21/2025                Anesthesia Plan      Present on Admission:  **None**             [1]   Medications Prior to Admission   Medication Sig Dispense Refill Last Dose/Taking   • IRON OR 65 mg.      • prenatal vitamin with DHA 27-0.8-228 MG Oral Cap Take 1 capsule by mouth daily.

## 2025-01-21 NOTE — L&D DELIVERY NOTE
Andria Girl [[id number]]      Labor Events     labor?: No   steroids?: None  Antibiotics received during labor?: No  Rupture date/time: 2025     Rupture type: SROM  Fluid color: Clear  Labor type: Spontaneous Onset of Labor  Augmentation: Oxytocin  Indications for augmentation: Ineffective Contraction Pattern  Intrapartum & labor complications: None       Labor Event Times    Labor onset date/time: 2025  Dilation complete date/time: 2025 1407  Start pushing date/time: 2025 173        Presentation    Presentation: Vertex  Position: Middle Occiput Anterior       Operative Delivery    Operative Vaginal Delivery: Yes  Forceps attempted?: No  Vacuum extractor attempted?: Yes  Indications: Maternal Fatigue   Vacuum type: Kiwi Pro (bell)   Vacuum application location: Outlet   Number of pop offs: 1      Failed?: No              Shoulder Dystocia    Shoulder Dystocia: No       Anesthesia    Method: Epidural, Local              Center Delivery      Head delivery date/time: 2025 19:41:00   Delivery date/time:  25 19:41:14   Delivery type: Vaginal, Vacuum (Extractor)    Details:     Delivery location: delivery room       Delivery Providers    Delivering Clinician: Ольга Duarte MD   Delivery personnel:  Provider Role   Jocelyn Howell, RN Baby Nurse   Latha Elliott RN Delivery Nurse             Cord    Vessels: 3 Vessels  Complications: None  Timed cord clamping: Yes  Time in sec: 30  Cord blood disposition: to lab  Gases sent?: No       Resuscitation    Method: None       Center Measurements      Weight: 3560 g 7 lb 13.6 oz Length: 47 cm     Head circum.: 36 cm Chest circum.: 34.5 cm      Abdominal circum.: 32.5 cm           Placenta    Date/time: 2025 194  Removal: Spontaneous  Appearance: Intact  Disposition: held for future pathology       Apgars    Living status: Living   Apgar Scoring Key:    0 1 2    Skin color Blue or pale  Acrocyanotic Completely pink    Heart rate Absent <100 bpm >100 bpm    Reflex irritability No response Grimace Cry or active withdrawal    Muscle tone Limp Some flexion Active motion    Respiratory effort Absent Weak cry; hypoventilation Good, crying              1 Minute:  5 Minute:  10 Minute:  15 Minute:  20 Minute:      Skin color: 0  1       Heart rate: 2  2       Reflex irritablity: 2  2       Muscle tone: 2  2       Respiratory effort: 2  2       Total: 8  9          Apgars assigned by: LAURENT ROJAS  [place] disposition: with mother       Skin to Skin    No data filed       Vaginal Count    Initial count RN: Latha Elliott RN  Initial count Tech: Christine Reardon RN   Sponges   Sharps    Initial counts 11   0    Final counts 11   3    Final count RN: Latha Elliott RN  Final count MD: Ольга Duarte MD       Lacerations    Episiotomy: None  Perineal lacerations: 2nd Repaired?: Yes     Vaginal laceration?: No      Cervical laceration?: No    Clitoral laceration?: No                    Vacuum Assisted Vaginal Delivery Note      Patient is a 34 year old  at 37w6d presenting for augmentation of labor/SROM clear.    Labor  Augmentation of labor was achieved with pitocin.  Epidural was used for pain management.  Patient progressed to completely dilated and started pushing at 1735.      Delivery  Due to maternal exhaustion, patient request vacuum assisted vaginal delivery.  Maternal and fetal risks discussed with patient.  Bladder was emptied.  Vacuum was placed in occiput anterior position at +3 station.  With good maternal effort and 1 pull,  head crowned in occiput anterior position and the body delivered at 1941.   No shoulder dystocia.  IV pitocin bolus was given.  Within 30 minutes of delivery, placenta was delivered with gentle traction.      Lacerations  2nd degree laceration was repaired in the usual fashion with 3-0 vicryl.    All instruments and sponges were counted.  Debrief of  delivery was done with the family.  Fundus was firm and bleeding minimal when provider left the room.    QBL Quantitative Blood Loss (mL)  150 mL        Birth weight: 7 lbs 13 oz  APGARS: 8,9    Welcome baby girl!  Partner Hadi present at delivery

## 2025-01-21 NOTE — DISCHARGE INSTRUCTIONS
Post Vaginal Delivery Home Care Instructions       We hope you were pleased with your care at [facility].  We wish you the best outcome and overall experience with the delivery of your baby.  These instructions will help to minimize pain, limit the risk for an infection, and improve the likelihood of a successful recovery.    What to Expect:  Abdominal cramping after delivery especially if you are breastfeeding.   Vaginal bleeding for about 4-6 weeks that may be followed by a yellow or white discharge for a few more weeks.  Your period will resume in approximately 6-8 weeks, unless you are breastfeeding.    If you are bottle feeding, you may notice breast engorgement in about 3 days.  Your breast may be sore and hard. Please wear a tight fitted bra or sports bra for 24-36 hours to help prevent your breast from producing milk, and use ice packs to relive any discomfort.  If you are breastfeeding, nipple dryness is very common the first few days.    Constipation is common after having a baby.  Please increase fluid and fiber in your diet.      Over-The-Counter Medication  Non-prescription anti-inflammatory medications can also help to ease the pain.  You may take Aleve, Tylenol or Ibuprofen   Colace or Metamucil for Constipation  Lanolin for dry nipples  Tucks, Witch Hazel and Epifoam for Episiotomy discomfort.   Drink a full glass of water with oral medication and take as directed.    Wound Care  The following instructions will promote proper healing and help to prevent infection  Episiotomy Care: Sitz Bath for 15mins, 2-3 times a day,    Bathing/Showers  You may resume showers  No baths, swimming, hot tubs until your post-partum visit    Home Medication  Resume your home medications as instructed    Diet  Resume your normal diet    Activity  Refrain from vaginal intercourse, vaginal suppositories, tampon use or douches until after your post-partum visit.  No exercising for 4 weeks  You may climb stairs  minimally for the 1st week.    Do not do heavy housework for at least 2-3 weeks    Return to Work or School  You may return to work in approximately 6 weeks  Contact your obstetrician’s office, if you need a medical release.     Driving  Avoid driving if you are taking narcotics for pain relief.    Follow-up Appointment with Your Obstetrician  Call your obstetrician’s office today for an appointment in ______  weeks.    Verify your appointment date, day, time, and location.  At your 1st post-partum office visit:  Your progress will be evaluated, findings reviewed, and any additional concerns and instructions will be discussed.    Questions or Concerns  Call your obstetrician’s office if you experience the following:  Severe pain not controlled by pain medication  Too much pain when touched; yellowish, greenish, or bloody discharge, foul smelling vaginal discharge, cut site opens up  Heavy bleeding,problems with pain that does not go away or gets worse  Shortness of breath or sudden onset of chest pain  Fever of 100.4 F (38 C) or higher, chills, warmth around wound that will not heal  Redness, increased swelling or drainage from your incision  Crying and periods of sadness that prevents you from caring for yourself and your baby or you feel like harming yourself or baby.  Burning sensation during urination or inability to urinate or have bowel movements  Swelling, redness or abnormal warmth to your leg/calf  Swollen, hard, or painful breasts  You are not feeling better in 2 to 3 days, or are feeling increasingly worse  If your call is made after office hours, a physician will be available to help you.  There is always a provider covering our patients.                      MEDICATIONS offered/used during your stay:    @ MOTRIN (Ibuprofin 600mg)   1 tab every 6 hours as needed for pain   Last taken at:   --> Next dose due at:       @ TYLENOL (Acetaminophen 500)   1-2 tabs, every 6 hours, as needed for increased  pain.  Last taken at:   --> Next dose due at:     @ COLACE (Docusate Sodium 100mg)  1 tab two times per day as needed for stool softening   (once in am/once in pm)  Last taken at:   --> Next dose due at:      @ SIMETHICONE (Mylicon 80mg) Chewable Tab   1 tab daily as needed for gas.  Last taken at:    @ DERMOPLAST (Pain Relieving Spray)   Use as needed for perineal discomfort    @ TUCKS (Witch-Mag Glycerin pads)   Use as needed for hemorrhoids and/or perineal discomfort    Please see your Parent-Infant instruction pamphlet in your white [place] folder for further reference/review/instructions.  Thank you for coming to [facility].  We hope you've enjoyed your experience here.    Postpartum care: What to expect after a vaginal birth  When caring for a , you might forget to care for yourself. But that's important too. Learn what's involved as you recover from giving birth.  Pregnancy changes a body in more ways than you might expect. And that doesn't stop when you give birth. Here's what can happen physically and emotionally after a vaginal delivery.  Vaginal soreness  You might have had a tear in your vagina during delivery. Or your healthcare professional may have made a cut in the vaginal opening, called an episiotomy, to make delivery easier. The wound may hurt for a few weeks. Large tears can take longer to heal. To ease the pain:  Sit on a pillow or padded ring.  Cool the area with an ice pack. Or put a chilled witch hazel pad between a sanitary napkin and the area between your vaginal opening and anus. That area is called the perineum.  Use a squirt bottle to spray warm water over the perineum as you urinate.  Sit in a warm bath just deep enough to cover your buttocks and hips for five minutes. Use cold water if it feels better.  Take a pain reliever that you can buy without a prescription. Ask your healthcare professional about a numbing spray or cream, if needed.  .Avoid straining due to  constipation through adequate hydration and a diet that incorporates whole foods that are plant-based.  If this is not enough to keep your stools a toothpaste like consistency, please add over-the-counter fiber supplementation like Metamucil or a daily osmotic laxative like Miralax.  You can take one capful of Miralax with water or juice each morning and, as needed, in the evening.  While having a bowel movement, you can use can also use a stool under your feet or a squatty potty to help prevent straining.  Tell your healthcare professional if you have intense pain, lasting pain or if the pain gets worse. It could be a sign of an infection.    Vaginal discharge  After delivery, a mix of blood, mucus and tissue from the uterus comes out of the vagina. This is called discharge. The discharge changes color and lessens over 4 to 6 weeks after a baby is born. It starts bright red, then turns darker red. After that, it usually turns yellow or white. The discharge then slows and becomes watery until it stops.  Contact your healthcare professional if blood from your vagina soaks a pad hourly for two hours in a row, especially if you also have a fever, pelvic pain or tenderness.  Contractions  You might feel contractions, sometimes called afterpains, for a few days after delivery. These contractions often feel like menstrual cramps. They help keep you from bleeding too much because they put pressure on the blood vessels in the uterus. Afterpains are common during breastfeeding. That's because breastfeeding causes the release of the hormone oxytocin.  To ease the pain, you can use acetaminophen (Tylenol, others) or ibuprofen (Advil, Motrin IB, others).  Leaking urine  Pregnancy, labor and a vaginal delivery can stretch or hurt your pelvic floor muscles. These muscles support the uterus, bladder and rectum. As a result, some urine might leak when you sneeze, laugh or cough. The leaking usually gets better within a week. But it  might go on longer. Leaking urine also is called incontinence.  Until the leaking stops, wear sanitary pads. Do pelvic floor muscle training, also called Kegels, to tone your pelvic floor muscles and help control your bladder.  To do Kegels, think of sitting on a marble. Tighten your pelvic muscles as if you're lifting the marble. Try it for three seconds at a time, then relax for a count of three. Work up to doing the exercise 10 to 15 times in a row, at least three times a day. To make sure you're doing Kegels right, it might help to see a physical therapist who specializes in pelvic floor exercises.  Hemorrhoids and bowel movements  If you notice pain during bowel movements and feel swelling near your anus, you might have swollen veins in the anus or lower rectum, called hemorrhoids. To ease hemorrhoid pain:  Use a hemorrhoid cream or a medicine that you put into your anus, called a suppository, that has hydrocortisone. You can buy either without a prescription.  Wipe the area with pads that have witch hazel or a numbing agent.  Soak your anal area in plain warm water for 10 to 15 minutes 2 to 3 times a day.  You might be afraid to have a bowel movement because you don't want to make the pain of hemorrhoids or your episiotomy wound worse. Take steps to keep stools soft and regular. Eat foods high in fiber, including fruits, vegetables and whole grains. Drink plenty of water. Ask your healthcare professional about a stool softener, if needed.  Sore breasts  A few days after giving birth, you might have full, firm, sore breasts. That's because your breast tissue overfills with milk, blood and other fluids. This condition is called engorgement. Breastfeed your baby often on both breasts to help keep them from overfilling.  If your breasts are engorged, your baby might have trouble attaching for breastfeeding. To help your baby latch on, you can use your hand or a breast pump to let out some breast milk before  feeding your baby. That process is called expressing.  To ease sore breasts, put warm washcloths on them or take a warm shower before breastfeeding or expressing. That can make it easier for the milk to flow. Between feedings, put cold washcloths on your breasts. Pain relievers you can buy without a prescription might help too.  If you're not breastfeeding, wear a bra that supports your breasts, such as a sports bra. Don't pump your breasts or express the milk. That causes your breasts to make more milk. Putting ice packs on your breasts can ease discomfort. Pain relievers available without a prescription also can be helpful.  Hair loss and skin changes  During pregnancy, higher hormone levels mean your hair grows faster than it sheds. The result is more hair on your head. But for up to five months after giving birth, you lose more hair than you grow. This hair loss stops over time.  Stretch marks on the skin don't go away after delivery. But in time, they fade. Expect any skin that got darker during pregnancy, such as dark patches on your face, to fade slowly too.  Mood changes  Childbirth can trigger a lot of feelings. Many people have a period of feeling down or anxious after giving birth, sometimes called the baby blues. Symptoms include mood swings, crying spells, anxiety and trouble sleeping. These feelings often go away within two weeks. In the meantime, take good care of yourself. Share your feelings, and ask your partner, loved ones or friends for help.  If you have large mood swings, don't feel like eating, are very tired and lack theresa in life shortly after childbirth, you might have postpartum depression. Contact your healthcare professional if you think you might be depressed. Be sure to seek help if:  Your symptoms don't go away on their own.  You have trouble caring for your baby.  You have a hard time doing daily tasks.  You think of harming yourself or your baby.    Medicines and counseling often can  ease postpartum depression.  Please talk to your provider if you are interested in either of these treatments.  Weight loss  It's common to still look pregnant after giving birth. Most people lose about 13 pounds (6 kilograms) during delivery. This loss includes the weight of the baby, placenta and amniotic fluid.  In the days after delivery, you'll lose more weight from leftover fluids. After that, a healthy diet and regular exercise can help you to return to the weight you were before pregnancy.  Postpartum checkups  The American College of Obstetricians and Gynecologists says that postpartum care should be an ongoing process rather than a single visit after delivery. Check in with your healthcare professional within 2 to 3 weeks after delivery by phone or in person to talk about any issues you've had since giving birth.  Within 6 to 12 weeks after delivery, see your healthcare professional for a complete postpartum exam. During this visit, your healthcare professional does a physical exam and checks your belly, vagina, cervix and uterus to see how well you're healing.  Things to talk about at this visit include:  Your mood and emotional well-being.  How well you're sleeping.  Other symptoms you might have, such as tiredness.  Birth control and birth spacing.  Baby care and feeding.  When you can start having sex again.  What you can do about pain with sex or not wanting to have sex.  How you're adjusting to life with a new baby.  This checkup is a chance for you and your healthcare professional to make sure you're OK. It's also a time to get answers to questions you have about life after giving birth.

## 2025-01-22 NOTE — PROGRESS NOTES
PPD#1    Concerned about constipation, pt otherwise has no complaints, pain is controlled, lochia minimal. Tolerating PO, voiding without difficulty.    Vitals:    25 0802   BP: 95/67   Pulse: 89   Resp: 20   Temp: 98.2 °F (36.8 °C)     Recent Labs   Lab 25  0907 25  0653   RBC 4.19 3.49*   HGB 13.2 10.9*   HCT 38.0 31.4*   MCV 90.7 90.0   MCH 31.5 31.2   MCHC 34.7 34.7   RDW 13.5 13.7   NEPRELIM 6.59 9.61*   WBC 9.6 12.4*   .0 153.0         Gen: NAD, appears well  Uterus: firm, below umbilicus    34 year old  now PPD#1 s/p vacuum assisted vaginal delivery on 25  - AFVSS  - doing well  - Rh pos  - plan for discharge PPD2. Discussed discharge instructions with pt and plan for follow up in 6 weeks.

## 2025-01-22 NOTE — CM/SW NOTE
01/21/25 0821   Financial Resource Strain   How hard is it for you to pay for things like household items or child/elder care? Not hard   Access to Medications   Do you have trouble affording medicines, medical supplies, or paying for your care? N   Utilities   In the past 12 months has the electric, gas, oil, or water company threatened to shut off services in your home? No   Food Insecurity   Recently, have there been times that your food ran out and you didn't have money to get more? Never true   Did patient receive WIC with this pregnancy? No   Transportation Needs   Currently, has lack of transportation kept you from getting where you want or need to go? (For ex: to medical appointments, picking up medications, groceries, or work)? no   Do you have a car seat for your baby? Yes   Housing Stability   In the past 12 months, was there a time when you did not have a steady place to sleep or slept in a shelter? N   Do you have a crib or bassinette for your baby to sleep in? Yes   Domestic safety   At any time do you feel concerned for the safety/well-being of yourself and/or your children, in your home or elsewhere? N   Does healthcare provider observe any obvious signs or symptoms of abuse/neglect? No   Stress   Would you like help finding professional services to help with stress, depression, anxiety, or other mental health concerns? N   Were you screened prenatally for any mood disorders during pregnancy? Yes   Did you receive care for any mood disorders during your pregnancy? No   OUD Screening Risk Assessment   Did any of your parents have problems with alcohol or drug use? No   Do any of your friends (peers) have problems with alcohol or drug use? No   Does your partner have a problem with alcohol or drug use? No   Before you were pregnant did you have problems with alcohol or drug use, including prescription medications in the past? No   Did you drink beer, wine, or liquor in the past month, consume,  smoke, or vape any marijuana products, use illegal drugs, and/or prescribed or non-prescribed pain medications? No     SW order acknowledged. Case discussed with RN. SW met with pt and sps to discuss DC planning.    Pt and sps with cheerful affect. They had guest present so interaction was brief. Parents report they live in [place] and this is their first baby. Pt reports strong support from family, and friends.     Parents requesting list of in network pediatrician. SW printed list from [employer] website, provided list to parents. SW encouraged parents to review list and call office of choice. Encouraged them to update RN of PCP choice as soon as possible.    Parents in agreement and report no further questions at this time.    SW/CM to remain available for dc planning, and/or additional need for support.    ISAIAH Kamara  Discharge Planner  [id number]

## 2025-01-22 NOTE — PAYOR COMM NOTE
--------------  ADMISSION REVIEW     Payor: [employer]OW[employer]ATTY  Subscriber #:  [id number]  Authorization Number: [id number]    Admit date: 25  Admit time:  9:03 AM       Kera Ayers [[id number]]       Labor Events     labor?: No   steroids?: None  Antibiotics received during labor?: No  Rupture date/time: 2025     Rupture type: SROM  Fluid color: Clear  Labor type: Spontaneous Onset of Labor  Augmentation: Oxytocin  Indications for augmentation: Ineffective Contraction Pattern  Intrapartum & labor complications: None         Labor Event Times    Labor onset date/time: 2025  Dilation complete date/time: 2025  Start pushing date/time: 2025          Presentation    Presentation: Vertex  Position: Middle Occiput Anterior         Operative Delivery    Operative Vaginal Delivery: Yes  Forceps attempted?: No  Vacuum extractor attempted?: Yes      Indications: Maternal Fatigue   Vacuum type: Kiwi Pro (bell)   Vacuum application location: Outlet   Number of pop offs: 1      Failed?: No                   Shoulder Dystocia    Shoulder Dystocia: No         Anesthesia    Method: Epidural, Local                    [place] Delivery            Head delivery date/time: 2025 19:41:00   Delivery date/time:  25 19:41:14   Delivery type: Vaginal, Vacuum (Extractor)     Details:      Delivery location: delivery room         Delivery Providers    Delivering Clinician: Ольга Duarte MD    Delivery personnel:  Provider Role   Jocelyn Howell RN Baby Nurse   Latha Elliott RN Delivery Nurse                Cord    Vessels: 3 Vessels  Complications: None  Timed cord clamping: Yes  Time in sec: 30  Cord blood disposition: to lab  Gases sent?: No         Resuscitation    Method: None         [place] Measurements       Weight: 3560 g 7 lb 13.6 oz Length: 47 cm      Head circum.: 36 cm Chest circum.: 34.5 cm       Abdominal circum.: 32.5 cm                Placenta    Date/time: 2025  Removal: Spontaneous  Appearance: Intact  Disposition: held for future pathology         Apgars    Living status: Living    Apgar Scoring Key:    0 1 2     Skin color Blue or pale Acrocyanotic Completely pink     Heart rate Absent <100 bpm >100 bpm     Reflex irritability No response Grimace Cry or active withdrawal     Muscle tone Limp Some flexion Active motion     Respiratory effort Absent Weak cry; hypoventilation Good, crying                1 Minute:  5 Minute:  10 Minute:  15 Minute:  20 Minute:       Skin color: 0  1          Heart rate: 2  2          Reflex irritablity: 2  2          Muscle tone: 2  2          Respiratory effort: 2  2          Total: 8  9             Apgars assigned by: LAURENT ROJAS   disposition: with mother         Skin to Skin    No data filed         Vaginal Count    Initial count RN: Latha Elliott RN  Initial count Tech: Christine Reardon RN    Sponges     Sharps     Initial counts 11     0     Final counts 11     3     Final count RN: Latha Elliott RN  Final count MD: Ольга Duarte MD         Lacerations    Episiotomy: None  Perineal lacerations: 2nd Repaired?: Yes      Vaginal laceration?: No        Cervical laceration?: No     Clitoral laceration?: No                            Vacuum Assisted Vaginal Delivery Note        Patient is a 34 year old  at 37w6d presenting for augmentation of labor/SROM clear.     Labor  Augmentation of labor was achieved with pitocin.  Epidural was used for pain management.  Patient progressed to completely dilated and started pushing at 1735.       Delivery  Due to maternal exhaustion, patient request vacuum assisted vaginal delivery.  Maternal and fetal risks discussed with patient.  Bladder was emptied.  Vacuum was placed in occiput anterior position at +3 station.  With good maternal effort and 1 pull,  head crowned in occiput anterior position and the body delivered at 1941.    No shoulder dystocia.  IV pitocin bolus was given.  Within 30 minutes of delivery, placenta was delivered with gentle traction.       Lacerations  2nd degree laceration was repaired in the usual fashion with 3-0 vicryl.     All instruments and sponges were counted.  Debrief of delivery was done with the family.  Fundus was firm and bleeding minimal when provider left the room.     QBL Quantitative Blood Loss (mL)  150 mL          Birth weight: 7 lbs 13 oz  APGARS: 8,9     Welcome baby girl!  Partner Hadi present at delivery                           MEDICATIONS ADMINISTERED IN LAST 1 DAY:  acetaminophen (Tylenol Extra Strength) tab 1,000 mg       Date Action Dose Route User    2025 2018 Given 1,000 mg Oral Mamie Cantrell RN          benzocaine-menthol (Dermoplast) 20-0.5 % topical spray 1 spray       Date Action Dose Route User    2025 Given 1 spray Topical Mamie Cantrell RN          bupivacaine PF (Marcaine) 0.25% injection       Date Action Dose Route User    2025 1312 Given 7 mL Epidural Jonathan Calixto MD          docusate sodium (Colace) cap 100 mg       Date Action Dose Route User    2025 0502 Given 100 mg Oral Renetta Curtis RN    2025 2043 Given 100 mg Oral Mamie Cantrell RN          ibuprofen (Motrin) tab 600 mg       Date Action Dose Route User    2025 0502 Given 600 mg Oral Renetta Curtis RN    2025 2350 Given 600 mg Oral Renetta Curtis RN          lactated ringers infusion       Date Action Dose Route User    2025 1809 New Bag (none) Intravenous Christine Reardon RN    2025 1309 New Bag (none) Intravenous Christine Reardon RN          lactated ringers infusion       Date Action Dose Route User    2025 2145 New Bag (none) Intravenous Mamie Cantrell RN          lidocaine 1.5%-EPINEPHrine 1:200,000 (Xylocaine-Epinephrine) injection       Date Action Dose Route User    2025 1312 Given 3 mL Epidural  Jonathan Calixto MD          oxyTOCIN in sodium chloride 0.9% (Pitocin) 30 Units/500mL infusion premix       Date Action Dose Route User    1/21/2025 1944 Restarted 900 leesa-units/min Intravenous Latha Elliott, CRYSTAL          oxyTOCIN in sodium chloride 0.9% (Pitocin) 30 Units/500mL infusion premix       Date Action Dose Route User    1/21/2025 1737 Rate/Dose Change 6 leesa-units/min Intravenous Christine Reardon RN    1/21/2025 1707 Rate/Dose Change 4 leesa-units/min Intravenous Christine Reardon RN    1/21/2025 1545 Restarted 2 leesa-units/min Intravenous Christine Reardon RN    1/21/2025 1132 Rate/Dose Change 4 leesa-units/min Intravenous Christine Reardon RN    1/21/2025 1035 New Bag 2 leesa-units/min Intravenous Christine Reardon RN          witch hazel-glycerin ( WITCH HAZEL) external pad       Date Action Dose Route User    1/21/2025 2043 Given 40 each Topical Mamie Cantrell RN            Vitals (last day)       Date/Time Temp Pulse Resp BP SpO2 Weight O2 Device O2 Flow Rate (L/min) [facility]    01/22/25 0830 -- -- -- -- -- -- None (Room air) -- RB    01/22/25 0802 98.2 °F (36.8 °C) -- -- -- -- -- -- -- RB    01/22/25 0802 -- 89 20 95/67 -- -- -- -- GG    01/21/25 2357 -- 101 -- 99/60 -- -- -- -- KH    01/21/25 2145 -- 91 -- 96/57 -- -- -- -- GE    01/21/25 2131 97.7 °F (36.5 °C) 112 20 93/53 -- -- -- -- GE    01/21/25 2113 -- -- -- 100/51 -- -- -- -- GE    01/21/25 2100 98 °F (36.7 °C) 118 16 107/54 -- -- -- -- GE    01/21/25 2030 98.8 °F (37.1 °C) 110 20 123/58 -- -- -- -- GE    01/21/25 2015 -- 106 -- 108/59 -- -- -- -- MS    01/21/25 1945 98.6 °F (37 °C) 103 20 107/54 -- -- None (Room air) -- MS    01/21/25 1931 -- 121 -- -- -- -- -- -- JN    01/21/25 1930 98.9 °F (37.2 °C) 116 20 114/64 99 % -- Nasal cannula -- MS    01/21/25 1925 -- 128 -- -- 99 % -- -- -- JN    01/21/25 1913 -- 80 -- -- -- -- -- -- JN    01/21/25 1858 -- 98 -- -- -- -- -- -- JN    01/21/25 1843 -- 135 -- 115/58 -- -- -- --      01/21/25 1828 -- 128 -- 108/62 -- -- -- -- JN 01/21/25 1813 -- 110 -- 112/63 -- -- -- -- JN 01/21/25 1758 -- 135 -- 96/69 -- -- -- -- JN 01/21/25 1746 98.7 °F (37.1 °C) -- -- -- -- -- -- -- JN 01/21/25 1743 -- 109 -- 111/65 -- -- -- -- JN 01/21/25 1728 -- 101 -- 109/67 -- -- -- -- JN 01/21/25 1713 -- 102 -- 107/62 -- -- -- -- JN 01/21/25 1658 -- 95 -- 108/62 -- -- -- -- JN 01/21/25 1643 -- 101 -- 109/61 -- -- -- -- JN 01/21/25 1628 -- 81 -- 107/62 -- -- -- -- JN 01/21/25 1613 -- 93 -- 119/54 -- -- -- -- JN 01/21/25 1600 -- 87 -- 109/65 -- -- -- -- JN 01/21/25 1545 98.2 °F (36.8 °C) 93 -- 114/68 -- -- -- -- JN 01/21/25 1528 -- 94 -- 110/66 -- -- -- -- JN 01/21/25 1513 -- 97 -- 110/66 -- -- -- -- JN 01/21/25 1458 -- 105 -- 110/69 -- -- -- -- JN 01/21/25 1430 -- 98 -- 104/60 -- -- -- -- JN 01/21/25 1346 -- 88 -- 107/61 -- -- -- -- JN 01/21/25 1343 -- 93 -- 118/69 -- -- -- -- JN 01/21/25 1340 -- 102 -- 102/59 -- -- -- -- JN 01/21/25 1337 -- 90 -- 103/63 -- -- -- -- JN    01/21/25 1334 -- 85 -- 110/65 -- -- -- -- JN 01/21/25 1331 -- 160 -- 103/65 -- -- -- -- JN 01/21/25 1328 -- 85 -- 108/64 -- -- -- -- JN 01/21/25 1325 -- 87 -- 107/62 -- -- -- -- JN 01/21/25 1322 -- 97 -- 109/60 -- -- -- -- JN 01/21/25 1319 -- 98 -- 109/60 -- -- -- -- JN 01/21/25 1316 -- 71 -- 107/62 -- -- -- -- JN 01/21/25 1313 98.4 °F (36.9 °C) 93 -- 119/51 -- -- -- -- JN    01/21/25 1310 -- 97 -- 105/64 -- -- -- -- JN 01/21/25 1142 -- 85 -- 111/66 -- -- -- -- JN 01/21/25 1035 98.4 °F (36.9 °C) 93 -- 105/63 -- -- -- -- JN 01/21/25 0930 -- -- -- -- -- 165 lb (74.8 kg) -- -- JN 01/21/25 0828 98.6 °F (37 °C) -- 15 -- -- 165 lb (74.8 kg) -- -- JN 01/21/25 0815 -- 94 -- 131/71 -- -- -- -- JN          CIWA Scores (since admission)       None

## 2025-01-22 NOTE — PROGRESS NOTES
Labor Analgesia Follow Up Note    Patient underwent epidural anesthesia for labor analgesia,    Placenta Date/Time: 1/21/2025  7:44 PM    Delivery Date/Time:: 1/21/2025  7:41 PM    BP 95/67 (BP Location: Left arm)   Pulse 89   Temp 98.2 °F (36.8 °C) (Oral)   Resp 20   Ht 1.64 m (5' 4.57\")   Wt 74.8 kg (165 lb)   LMP 05/01/2024 (Exact Date)   SpO2 99%   Breastfeeding Yes   BMI 27.83 kg/m²     Assessment:  Patient seen and no apparent anesthesia related complications.    Thank you for asking us to participate in the care of your patient.    Edin Sargent MD  [facility]

## 2025-01-22 NOTE — PROGRESS NOTES
Patient in room 1114, OB notified re: patient passing out in bathroom in  111 L/D while trying to void and Ammonia held by her nose and after 30sec patient woke up from smell, IV LR running right now with order from OB.  Patient AOx4, talking to her significant other, waiting for their food to come from home since patient verbalized very hungry, plan of care in place, report given to Renetta ROJAS , and will take over taking care of patient and her baby

## 2025-01-22 NOTE — PLAN OF CARE
Problem: BIRTH - VAGINAL/ SECTION  Goal: Fetal and maternal status remain reassuring during the birth process  Description: INTERVENTIONS:  - Monitor vital signs  - Monitor fetal heart rate  - Monitor uterine activity  - Monitor labor progression (vaginal delivery)  - DVT prophylaxis (C/S delivery)  - Surgical antibiotic prophylaxis (C/S delivery)  Outcome: Completed     Problem: PAIN - ADULT  Goal: Verbalizes/displays adequate comfort level or patient's stated pain goal  Description: INTERVENTIONS:  - Encourage pt to monitor pain and request assistance  - Assess pain using appropriate pain scale  - Administer analgesics based on type and severity of pain and evaluate response  - Implement non-pharmacological measures as appropriate and evaluate response  - Consider cultural and social influences on pain and pain management  - Manage/alleviate anxiety  - Utilize distraction and/or relaxation techniques  - Monitor for opioid side effects  - Notify MD/LIP if interventions unsuccessful or patient reports new pain  - Anticipate increased pain with activity and pre-medicate as appropriate  Outcome: Completed     Problem: ANXIETY  Goal: Will report anxiety at manageable levels  Description: INTERVENTIONS:  - Administer medication as ordered  - Teach and rehearse alternative coping skills  - Provide emotional support with 1:1 interaction with staff  Outcome: Progressing     Problem: Patient/Family Goals  Goal: Patient/Family Long Term Goal  Description: Patient's Long Term Goal:     Interventions:  -   - See additional Care Plan goals for specific interventions  Outcome: Progressing  Goal: Patient/Family Short Term Goal  Description: Patient's Short Term Goal:     Interventions:   -   - See additional Care Plan goals for specific interventions  Outcome: Progressing     Problem: POSTPARTUM  Goal: Long Term Goal:Experiences normal postpartum course  Description: INTERVENTIONS:  - Assess and monitor vital signs and  lab values.  - Assess fundus and lochia.  - Provide ice/sitz baths for perineum discomfort.  - Monitor healing of incision/episiotomy/laceration, and assess for signs and symptoms of infection and hematoma.  - Assess bladder function and monitor for bladder distention.  - Provide/instruct/assist with pericare as needed.  - Provide VTE prophylaxis as needed.  - Monitor bowel function.  - Encourage ambulation and provide assistance as needed.  - Assess and monitor emotional status and provide social service/psych resources as needed.  - Utilize standard precautions and use personal protective equipment as indicated. Ensure aseptic care of all intravenous lines and invasive tubes/drains.  - Obtain immunization and exposure to communicable diseases history.  Outcome: Progressing  Goal: Optimize infant feeding at the breast  Description: INTERVENTIONS:  - Initiate breast feeding within first hour after birth.   - Monitor effectiveness of current breast feeding efforts.  - Assess support systems available to mother/family.  - Identify cultural beliefs/practices regarding lactation, letdown techniques, maternal food preferences.  - Assess mother's knowledge and previous experience with breast feeding.  - Provide information as needed about early infant feeding cues (e.g., rooting, lip smacking, sucking fingers/hand) versus late cue of crying.  - Discuss/demonstrate breast feeding aids (e.g., infant sling, nursing footstool/pillows, and breast pumps).  - Encourage mother/other family members to express feelings/concerns, and actively listen.  - Educate father/SO about benefits of breast feeding and how to manage common lactation challenges.  - Recommend avoidance of specific medications or substances incompatible with breast feeding.  - Assess and monitor for signs of nipple pain/trauma.  - Instruct and provide assistance with proper latch.  - Review techniques for milk expression (breast pumping) and storage of breast  milk. Provide pumping equipment/supplies, instructions and assistance, as needed.  - Encourage rooming-in and breast feeding on demand.  - Encourage skin-to-skin contact.  - Provide LC support as needed.  - Assess for and manage engorgement.  - Provide breast feeding education handouts and information on community breast feeding support.   Outcome: Progressing  Goal: Establishment of adequate milk supply with medication/procedure interruptions  Description: INTERVENTIONS:  - Review techniques for milk expression (breast pumping).   - Provide pumping equipment/supplies, instructions, and assistance until it is safe to breastfeed infant.  Outcome: Progressing  Goal: Appropriate maternal -  bonding  Description: INTERVENTIONS:  - Assess caregiver- interactions.  - Assess caregiver's emotional status and coping mechanisms.  - Encourage caregiver to participate in  daily care.  - Assess support systems available to mother/family.  - Provide /case management support as needed.  Outcome: Progressing

## 2025-01-22 NOTE — PROGRESS NOTES
Patient in room, received report from Latha ROJAS, will take over and finish patient recovery and will take care of her baby

## 2025-01-23 NOTE — PROGRESS NOTES
DISCHARGE NOTE  Discharge and follow-up appointment information reviewed with parents, no questions following.  ID Bands checked and verified at bedside. HUGS tag removed. Baby in: car seat  Parent in wheelchair.   Escorted off unit by: PCT

## 2025-01-23 NOTE — PLAN OF CARE
Problem: BIRTH - VAGINAL/ SECTION  Goal: Fetal and maternal status remain reassuring during the birth process  Description: INTERVENTIONS:  - Monitor vital signs  - Monitor fetal heart rate  - Monitor uterine activity  - Monitor labor progression (vaginal delivery)  - DVT prophylaxis (C/S delivery)  - Surgical antibiotic prophylaxis (C/S delivery)  Outcome: Completed     Problem: PAIN - ADULT  Goal: Verbalizes/displays adequate comfort level or patient's stated pain goal  Description: INTERVENTIONS:  - Encourage pt to monitor pain and request assistance  - Assess pain using appropriate pain scale  - Administer analgesics based on type and severity of pain and evaluate response  - Implement non-pharmacological measures as appropriate and evaluate response  - Consider cultural and social influences on pain and pain management  - Manage/alleviate anxiety  - Utilize distraction and/or relaxation techniques  - Monitor for opioid side effects  - Notify MD/LIP if interventions unsuccessful or patient reports new pain  - Anticipate increased pain with activity and pre-medicate as appropriate  Outcome: Completed     Problem: ANXIETY  Goal: Will report anxiety at manageable levels  Description: INTERVENTIONS:  - Administer medication as ordered  - Teach and rehearse alternative coping skills  - Provide emotional support with 1:1 interaction with staff  2025 by Rani Jacobo RN  Outcome: Progressing  2025 by Rani Jacobo RN  Outcome: Progressing     Problem: Patient/Family Goals  Goal: Patient/Family Long Term Goal  Description: Patient's Long Term Goal:     Interventions:    - See additional Care Plan goals for specific interventions  2025 by Rani Jacobo RN  Outcome: Progressing  2025 by Rani Jacobo RN  Outcome: Progressing  Goal: Patient/Family Short Term Goal  Description: Patient's Short Term Goal:     Interventions:   -   -  See additional Care Plan goals for specific interventions  Outcome: Progressing

## 2025-01-23 NOTE — PLAN OF CARE
Problem: ANXIETY  Goal: Will report anxiety at manageable levels  Description: INTERVENTIONS:  - Administer medication as ordered  - Teach and rehearse alternative coping skills  - Provide emotional support with 1:1 interaction with staff  1/23/2025 0815 by Rani Jacobo RN  Outcome: Completed  1/22/2025 1838 by Rani Jacobo RN  Outcome: Progressing     Problem: Patient/Family Goals  Goal: Patient/Family Long Term Goal  Description: Patient's Long Term Goal:     Interventions:  -   - See additional Care Plan goals for specific interventions  1/23/2025 0815 by Rani Jacobo RN  Outcome: Completed  1/22/2025 1838 by Rani Jacobo RN  Outcome: Progressing  Goal: Patient/Family Short Term Goal  Description: Patient's Short Term Goal:     Interventions:   -   - See additional Care Plan goals for specific interventions  Outcome: Completed     Problem: POSTPARTUM  Goal: Long Term Goal:Experiences normal postpartum course  Description: INTERVENTIONS:  - Assess and monitor vital signs and lab values.  - Assess fundus and lochia.  - Provide ice/sitz baths for perineum discomfort.  - Monitor healing of incision/episiotomy/laceration, and assess for signs and symptoms of infection and hematoma.  - Assess bladder function and monitor for bladder distention.  - Provide/instruct/assist with pericare as needed.  - Provide VTE prophylaxis as needed.  - Monitor bowel function.  - Encourage ambulation and provide assistance as needed.  - Assess and monitor emotional status and provide social service/psych resources as needed.  - Utilize standard precautions and use personal protective equipment as indicated. Ensure aseptic care of all intravenous lines and invasive tubes/drains.  - Obtain immunization and exposure to communicable diseases history.  1/23/2025 0815 by Rani Jcaobo RN  Outcome: Completed  1/22/2025 1838 by Rani Jacobo RN  Outcome: Progressing  Goal:  Optimize infant feeding at the breast  Description: INTERVENTIONS:  - Initiate breast feeding within first hour after birth.   - Monitor effectiveness of current breast feeding efforts.  - Assess support systems available to mother/family.  - Identify cultural beliefs/practices regarding lactation, letdown techniques, maternal food preferences.  - Assess mother's knowledge and previous experience with breast feeding.  - Provide information as needed about early infant feeding cues (e.g., rooting, lip smacking, sucking fingers/hand) versus late cue of crying.  - Discuss/demonstrate breast feeding aids (e.g., infant sling, nursing footstool/pillows, and breast pumps).  - Encourage mother/other family members to express feelings/concerns, and actively listen.  - Educate father/SO about benefits of breast feeding and how to manage common lactation challenges.  - Recommend avoidance of specific medications or substances incompatible with breast feeding.  - Assess and monitor for signs of nipple pain/trauma.  - Instruct and provide assistance with proper latch.  - Review techniques for milk expression (breast pumping) and storage of breast milk. Provide pumping equipment/supplies, instructions and assistance, as needed.  - Encourage rooming-in and breast feeding on demand.  - Encourage skin-to-skin contact.  - Provide LC support as needed.  - Assess for and manage engorgement.  - Provide breast feeding education handouts and information on community breast feeding support.   1/23/2025 0815 by Rani Jacobo, RN  Outcome: Completed  1/22/2025 1838 by Rani Jacobo, RN  Outcome: Progressing  Goal: Establishment of adequate milk supply with medication/procedure interruptions  Description: INTERVENTIONS:  - Review techniques for milk expression (breast pumping).   - Provide pumping equipment/supplies, instructions, and assistance until it is safe to breastfeed infant.  1/23/2025 0815 by Odalis  Rani ALBERT RN  Outcome: Completed  20258 by Rani Jacobo, RN  Outcome: Progressing  Goal: Appropriate maternal -  bonding  Description: INTERVENTIONS:  - Assess caregiver- interactions.  - Assess caregiver's emotional status and coping mechanisms.  - Encourage caregiver to participate in  daily care.  - Assess support systems available to mother/family.  - Provide /case management support as needed.  2025 0815 by Rani Jacobo, RN  Outcome: Completed  2025 by Rani Jacobo, RN  Outcome: Progressing

## 2025-01-23 NOTE — PLAN OF CARE
Problem: POSTPARTUM  Goal: Long Term Goal:Experiences normal postpartum course  Description: INTERVENTIONS:  - Assess and monitor vital signs and lab values.  - Assess fundus and lochia.  - Provide ice/sitz baths for perineum discomfort.  - Monitor healing of laceration, and assess for signs and symptoms of infection and hematoma.  - Assess bladder function and monitor for bladder distention.  - Provide/instruct/assist with pericare as needed.  - Provide VTE prophylaxis as needed.  - Monitor bowel function.  - Encourage ambulation and provide assistance as needed.  - Assess and monitor emotional status and provide social service/psych resources as needed.  - Utilize standard precautions and use personal protective equipment as indicated. Ensure aseptic care of all intravenous lines and invasive tubes/drains.  Problem: POSTPARTUM  Goal: Optimize infant feeding at the breast  Description: INTERVENTIONS:  - Monitor effectiveness of current breast feeding efforts.  - Assess support systems available to mother/family.  - Identify cultural beliefs/practices regarding lactation, letdown techniques, maternal food preferences.  - Assess mother's knowledge and previous experience with breast feeding.  - Provide information as needed about early infant feeding cues (e.g., rooting, lip smacking, sucking fingers/hand) versus late cue of crying.  - Discuss/demonstrate breast feeding aids (e.g., infant sling, nursing footstool/pillows, and breast pumps).  - Encourage mother/other family members to express feelings/concerns, and actively listen.  - Educate father/SO about benefits of breast feeding and how to manage common lactation challenges.  - Recommend avoidance of specific medications or substances incompatible with breast feeding.  - Assess and monitor for signs of nipple pain/trauma.  - Instruct and provide assistance with proper latch.  - Review techniques for milk expression (breast pumping) and storage of breast milk.  Provide pumping equipment/supplies, instructions and assistance, as needed.  - Encourage rooming-in and breast feeding on demand.  - Encourage skin-to-skin contact.  - Provide LC support as needed.  - Assess for and manage engorgement.  - Provide breast feeding education handouts and information on community breast feeding support.   Outcome: Progressing     - Obtain immunization and exposure to communicable diseases history.  Outcome: Progressing

## 2025-01-28 NOTE — PAYOR COMM NOTE
--------------  DISCHARGE REVIEW    Payor: KRYSMIRA ALANIZ  Subscriber #:  [id number]  Authorization Number: [id number]    Admit date: 1/21/25  Admit time:   9:03 AM  Discharge Date: 1/23/2025  3:25 PM     Admitting Physician: Ольга Mckenzie MD  Attending Physician:  No att. providers found  Primary Care Physician: Ericka Noriega MD

## 2025-03-03 NOTE — PROGRESS NOTES
[facility]  Obstetrics and Gynecology   History & Physical      Chief complaint:   Chief Complaint   Patient presents with    Postpartum Care     6 week PP   Vag-Vacuum 25 to a baby girl        Subjective:     HPI: Amanda Ayers is a 34 year old  s/p VAVD on 2025 for maternal exhaustion is presenting for post partum check.    - Prenatal care medically complicated by:  - advanced maternal aged  - failed 1 hour (passed 3 hour)  - iron deficiency    - Delivery and inpatient post partum course complicated by:    Post partum course:  - Vaginal bleeding: no hasn't had it yet.  Stopped bleeding 2 days ago.    - Lacerations/Incision: no pain  - Diet: regular   - BM: baseline  - Urinary: baseline  - Breastfeeding versus formula feeding: stopped 4 days ago  - Baby: meeting milestones but lots of colic - cries a lot.    - Mood: doing well  - Return to sex?: not yet  - Birth control: declines       ROS negative unless otherwise stated above    OB History  OB History    Para Term  AB Living   1 1 1 0 0 1   SAB IAB Ectopic Multiple Live Births   0 0 0 0 1   Obstetric Comments   Current - AMA, iron deficiency, failed 1 hr GTT passed 3 hr GTT      OB History    Para Term  AB Living   1 1 1     1   SAB IAB Ectopic Multiple Live Births         0 1      # Outcome Date GA Lbr Dominick/2nd Weight Sex Type Anes PTL Lv   1 Term 25 37w6d 12:07 / 05:34 7 lb 13.6 oz (3.56 kg) F Vag-Vacuum EPI, Local N ALBERTINA      Obstetric Comments   Current - AMA, iron deficiency, failed 1 hr GTT passed 3 hr GTT        Depression Scale Total: 0 (3/3/2025  9:55 AM)    PHQ-2 not done in last 12 months! Please administer and refresh!        Meds:  Medications Ordered Prior to Encounter[1]    PMH:  Past Medical History:    Anemia    Iron deficiency    Screening for genetic disease carrier status    Carrier Screen = NEGATIVE FOR 14 OUT OF 14 DISEASES       All:  Allergies[2]    PSH:  No past surgical history  on file.    Social History:  Social History     Socioeconomic History    Marital status:    Tobacco Use    Smoking status: Never    Smokeless tobacco: Never   Substance and Sexual Activity    Alcohol use: Not Currently    Drug use: Never    Sexual activity: Not Currently   Other Topics Concern    Caffeine Concern No    Stress Concern No    Weight Concern No    Special Diet No    Exercise No    Seat Belt No     Social Drivers of Health     Food Insecurity: No Food Insecurity (1/21/2025)    Food Insecurity     Food Insecurity: Never true   Transportation Needs: No Transportation Needs (1/21/2025)    Transportation Needs     Lack of Transportation: No     Car Seat: Yes   Stress: No Stress Concern Present (1/21/2025)    Stress     Feeling of Stress : No   Housing Stability: Low Risk  (1/21/2025)    Housing Stability     Housing Instability: No     Crib or Bassinette: Yes        Family History:  Family History   Problem Relation Age of Onset    Diabetes Neg        Immunization History:  Immunization History   Administered Date(s) Administered    Influenza Vaccine, trivalent (IIV3), PF 0.5mL (83865) 10/17/2024    RSV, bivalent, diluent reconstituted PF (Abrysvo) 12/13/2024    TDAP 11/13/2024         Objective:     Vitals:    03/03/25 0950   BP: 112/70   Pulse: 78   Weight: 145 lb 12.8 oz (66.1 kg)   Height: 64.5\"       Body mass index is 24.64 kg/m².    Physical Exam:     General: normal appearance  HEENT: normocephalic, no male pattern baldness, no acne  Breast: normal contour  Respiratory: normal work of breathing, no extra use of accessory muscles  Cardiac: normal rate  Abdominal: Nontender to palpation, no masses palpated  MSK: normal range of motion  Neuro: normal movement, normal sensory  Skin: no abnormalities seen    Pelvic Exam:  - normal appearing vulva, perineum, anus  - Perineal laceration well-healed - a little bit of firmness from scar  - normal appearing urethral meatus, urethra  - normal appearing  vagina, well estrogenized with ruggae, physiologic discharge  - cervix without masses       Labs:  Lab Results   Component Value Date    WBC 12.4 (H) 01/22/2025    RBC 3.49 (L) 01/22/2025    HGB 10.9 (L) 01/22/2025    HCT 31.4 (L) 01/22/2025    MCV 90.0 01/22/2025    MCH 31.2 01/22/2025    MCHC 34.7 01/22/2025    RDW 13.7 01/22/2025    .0 01/22/2025          Lab Results   Component Value Date     12/01/2024    A1C 5.1 12/01/2024         Assessment:     #post partum  - meeting goals of care  - cleared from pelvic rest in 2 weeks   - plan follow up for annual well woman exam    #family planning  #contraception  - discussed ACOG recommendation of 18 months between delivery and conception of next child to allow full maternal recovery to improve next pregnancy outcomes  - declines birth control     #Lifestyle counseling based on recommendations from the American College of Lifestyle Medicine  1) Nutrition: extensive scientific evidence supports a diet that is predominantly whole-food and plant based as an important strategy in health optimization.  Such a diet is rich in fiber, antioxidants and is nutrient dense (ex. Minimally processed vegetables, fruits, whole grains, legumes, nuts and seeds)  2) Physical activity: at least 150 minutes of moderate exercise per week (anything that gets your heart beating faster).  You could divide this time into 30 minutes per day for 5 days.  2 of these days should be devoted to whole body muscle-strengthening/building activities (weight lifting, for example).  3) Sleep: 7 to 9 hours per night of restorative sleep.  You can use black out curtains, white noise machine and minimize screen time to do this.    4) Continue to avoid or limit risky substances like alcohol, nicotine, vaping, drugs, prescription opioids.  If you need help - through medication or counseling - to do this, please contact me so I can get you a referral or resources to support you.  5) Stress: Continue  developing coping strategies to decrease stress.  6) Leverage the power of relationships and social networks to help reinforce health behaviors.  Studies show people are more successful at achieving health goals if the people they live with are supporting and even working towards those same health goals.    Return of care for annual exam    Ольга Duarte MD   [facility] - OBGYN    Note to patient and family:  The 21st Century Cures Act makes medical notes available to patients in the interest of transparency.  However, please be advised that this is a medical document.  It is intended as a peer to peer communication.  It is written in medical language and may contain abbreviations or verbiage that are technical and unfamiliar.  It may appear blunt or direct.  Medical documents are intended to carry relevant information, facts as evident, and the clinical opinion of the practitioner.         [1]   Current Outpatient Medications on File Prior to Visit   Medication Sig Dispense Refill    ibuprofen 600 MG Oral Tab Take 1 tablet (600 mg total) by mouth every 6 (six) hours as needed for Pain. 45 tablet 0    acetaminophen 500 MG Oral Tab Take 2 tablets (1,000 mg total) by mouth every 6 (six) hours as needed for Pain. 60 tablet 0    IRON OR 65 mg.      prenatal vitamin with DHA 27-0.8-228 MG Oral Cap Take 1 capsule by mouth daily.      docusate sodium 100 MG Oral Cap Take 1 capsule (100 mg total) by mouth 2 (two) times daily as needed for constipation. (Patient not taking: Reported on 3/3/2025) 60 capsule 0     No current facility-administered medications on file prior to visit.   [2] No Known Allergies